# Patient Record
Sex: FEMALE | Race: ASIAN | NOT HISPANIC OR LATINO | Employment: UNEMPLOYED | URBAN - METROPOLITAN AREA
[De-identification: names, ages, dates, MRNs, and addresses within clinical notes are randomized per-mention and may not be internally consistent; named-entity substitution may affect disease eponyms.]

---

## 2023-06-14 ENCOUNTER — ANCILLARY PROCEDURE (OUTPATIENT)
Dept: MAMMOGRAPHY | Facility: CLINIC | Age: 43
End: 2023-06-14
Payer: COMMERCIAL

## 2023-06-14 ENCOUNTER — OFFICE VISIT (OUTPATIENT)
Dept: PEDIATRICS | Facility: CLINIC | Age: 43
End: 2023-06-14
Payer: COMMERCIAL

## 2023-06-14 VITALS
RESPIRATION RATE: 16 BRPM | HEART RATE: 73 BPM | BODY MASS INDEX: 25.14 KG/M2 | OXYGEN SATURATION: 98 % | HEIGHT: 65 IN | WEIGHT: 150.9 LBS | DIASTOLIC BLOOD PRESSURE: 85 MMHG | TEMPERATURE: 97.3 F | SYSTOLIC BLOOD PRESSURE: 120 MMHG

## 2023-06-14 DIAGNOSIS — Z11.59 NEED FOR HEPATITIS C SCREENING TEST: ICD-10-CM

## 2023-06-14 DIAGNOSIS — Z11.3 ROUTINE SCREENING FOR STI (SEXUALLY TRANSMITTED INFECTION): ICD-10-CM

## 2023-06-14 DIAGNOSIS — Z30.431 SURVEILLANCE OF INTRAUTERINE CONTRACEPTIVE DEVICE: ICD-10-CM

## 2023-06-14 DIAGNOSIS — Z11.4 SCREENING FOR HIV (HUMAN IMMUNODEFICIENCY VIRUS): ICD-10-CM

## 2023-06-14 DIAGNOSIS — Z13.1 SCREENING FOR DIABETES MELLITUS: ICD-10-CM

## 2023-06-14 DIAGNOSIS — Z13.220 SCREENING CHOLESTEROL LEVEL: ICD-10-CM

## 2023-06-14 DIAGNOSIS — Z12.31 ENCOUNTER FOR SCREENING MAMMOGRAM FOR BREAST CANCER: ICD-10-CM

## 2023-06-14 DIAGNOSIS — Z12.4 CERVICAL CANCER SCREENING: ICD-10-CM

## 2023-06-14 DIAGNOSIS — Z00.00 ROUTINE GENERAL MEDICAL EXAMINATION AT A HEALTH CARE FACILITY: Primary | ICD-10-CM

## 2023-06-14 DIAGNOSIS — E55.9 VITAMIN D DEFICIENCY: ICD-10-CM

## 2023-06-14 PROBLEM — Z97.5 IUD (INTRAUTERINE DEVICE) IN PLACE: Status: ACTIVE | Noted: 2018-05-04

## 2023-06-14 LAB
ANION GAP SERPL CALCULATED.3IONS-SCNC: 11 MMOL/L (ref 7–15)
BUN SERPL-MCNC: 13.9 MG/DL (ref 6–20)
C TRACH DNA SPEC QL NAA+PROBE: NEGATIVE
CALCIUM SERPL-MCNC: 8.8 MG/DL (ref 8.6–10)
CHLORIDE SERPL-SCNC: 108 MMOL/L (ref 98–107)
CHOLEST SERPL-MCNC: 254 MG/DL
CREAT SERPL-MCNC: 0.72 MG/DL (ref 0.51–0.95)
DEPRECATED CALCIDIOL+CALCIFEROL SERPL-MC: 18 UG/L (ref 20–75)
DEPRECATED HCO3 PLAS-SCNC: 23 MMOL/L (ref 22–29)
GFR SERPL CREATININE-BSD FRML MDRD: >90 ML/MIN/1.73M2
GLUCOSE SERPL-MCNC: 95 MG/DL (ref 70–99)
HBA1C MFR BLD: 5.6 % (ref 0–5.6)
HCV AB SERPL QL IA: NONREACTIVE
HDLC SERPL-MCNC: 69 MG/DL
HIV 1+2 AB+HIV1 P24 AG SERPL QL IA: NONREACTIVE
LDLC SERPL CALC-MCNC: 168 MG/DL
N GONORRHOEA DNA SPEC QL NAA+PROBE: NEGATIVE
NONHDLC SERPL-MCNC: 185 MG/DL
POTASSIUM SERPL-SCNC: 4.4 MMOL/L (ref 3.4–5.3)
SODIUM SERPL-SCNC: 142 MMOL/L (ref 136–145)
TRIGL SERPL-MCNC: 86 MG/DL

## 2023-06-14 PROCEDURE — 82306 VITAMIN D 25 HYDROXY: CPT | Performed by: STUDENT IN AN ORGANIZED HEALTH CARE EDUCATION/TRAINING PROGRAM

## 2023-06-14 PROCEDURE — 87591 N.GONORRHOEAE DNA AMP PROB: CPT | Performed by: STUDENT IN AN ORGANIZED HEALTH CARE EDUCATION/TRAINING PROGRAM

## 2023-06-14 PROCEDURE — 77067 SCR MAMMO BI INCL CAD: CPT | Mod: TC | Performed by: RADIOLOGY

## 2023-06-14 PROCEDURE — 87491 CHLMYD TRACH DNA AMP PROBE: CPT | Performed by: STUDENT IN AN ORGANIZED HEALTH CARE EDUCATION/TRAINING PROGRAM

## 2023-06-14 PROCEDURE — 77063 BREAST TOMOSYNTHESIS BI: CPT | Mod: TC | Performed by: RADIOLOGY

## 2023-06-14 PROCEDURE — 86803 HEPATITIS C AB TEST: CPT | Performed by: STUDENT IN AN ORGANIZED HEALTH CARE EDUCATION/TRAINING PROGRAM

## 2023-06-14 PROCEDURE — 87389 HIV-1 AG W/HIV-1&-2 AB AG IA: CPT | Performed by: STUDENT IN AN ORGANIZED HEALTH CARE EDUCATION/TRAINING PROGRAM

## 2023-06-14 PROCEDURE — 36415 COLL VENOUS BLD VENIPUNCTURE: CPT | Performed by: STUDENT IN AN ORGANIZED HEALTH CARE EDUCATION/TRAINING PROGRAM

## 2023-06-14 PROCEDURE — 99386 PREV VISIT NEW AGE 40-64: CPT | Mod: GC | Performed by: STUDENT IN AN ORGANIZED HEALTH CARE EDUCATION/TRAINING PROGRAM

## 2023-06-14 PROCEDURE — 87624 HPV HI-RISK TYP POOLED RSLT: CPT | Performed by: STUDENT IN AN ORGANIZED HEALTH CARE EDUCATION/TRAINING PROGRAM

## 2023-06-14 PROCEDURE — 83036 HEMOGLOBIN GLYCOSYLATED A1C: CPT | Performed by: STUDENT IN AN ORGANIZED HEALTH CARE EDUCATION/TRAINING PROGRAM

## 2023-06-14 PROCEDURE — G0145 SCR C/V CYTO,THINLAYER,RESCR: HCPCS | Performed by: STUDENT IN AN ORGANIZED HEALTH CARE EDUCATION/TRAINING PROGRAM

## 2023-06-14 PROCEDURE — 80061 LIPID PANEL: CPT | Performed by: STUDENT IN AN ORGANIZED HEALTH CARE EDUCATION/TRAINING PROGRAM

## 2023-06-14 PROCEDURE — 80048 BASIC METABOLIC PNL TOTAL CA: CPT | Performed by: STUDENT IN AN ORGANIZED HEALTH CARE EDUCATION/TRAINING PROGRAM

## 2023-06-14 SDOH — HEALTH STABILITY: PHYSICAL HEALTH: ON AVERAGE, HOW MANY MINUTES DO YOU ENGAGE IN EXERCISE AT THIS LEVEL?: 60 MIN

## 2023-06-14 SDOH — ECONOMIC STABILITY: FOOD INSECURITY: WITHIN THE PAST 12 MONTHS, THE FOOD YOU BOUGHT JUST DIDN'T LAST AND YOU DIDN'T HAVE MONEY TO GET MORE.: SOMETIMES TRUE

## 2023-06-14 SDOH — ECONOMIC STABILITY: INCOME INSECURITY: HOW HARD IS IT FOR YOU TO PAY FOR THE VERY BASICS LIKE FOOD, HOUSING, MEDICAL CARE, AND HEATING?: NOT VERY HARD

## 2023-06-14 SDOH — ECONOMIC STABILITY: TRANSPORTATION INSECURITY
IN THE PAST 12 MONTHS, HAS LACK OF TRANSPORTATION KEPT YOU FROM MEETINGS, WORK, OR FROM GETTING THINGS NEEDED FOR DAILY LIVING?: NO

## 2023-06-14 SDOH — ECONOMIC STABILITY: TRANSPORTATION INSECURITY
IN THE PAST 12 MONTHS, HAS THE LACK OF TRANSPORTATION KEPT YOU FROM MEDICAL APPOINTMENTS OR FROM GETTING MEDICATIONS?: YES

## 2023-06-14 SDOH — HEALTH STABILITY: PHYSICAL HEALTH: ON AVERAGE, HOW MANY DAYS PER WEEK DO YOU ENGAGE IN MODERATE TO STRENUOUS EXERCISE (LIKE A BRISK WALK)?: 4 DAYS

## 2023-06-14 SDOH — ECONOMIC STABILITY: FOOD INSECURITY: WITHIN THE PAST 12 MONTHS, YOU WORRIED THAT YOUR FOOD WOULD RUN OUT BEFORE YOU GOT MONEY TO BUY MORE.: NEVER TRUE

## 2023-06-14 SDOH — ECONOMIC STABILITY: INCOME INSECURITY: IN THE LAST 12 MONTHS, WAS THERE A TIME WHEN YOU WERE NOT ABLE TO PAY THE MORTGAGE OR RENT ON TIME?: NO

## 2023-06-14 ASSESSMENT — ENCOUNTER SYMPTOMS
HEMATURIA: 0
JOINT SWELLING: 0
PARESTHESIAS: 0
NAUSEA: 0
SORE THROAT: 0
FEVER: 0
HEMATOCHEZIA: 0
HEADACHES: 0
FREQUENCY: 0
MYALGIAS: 0
SHORTNESS OF BREATH: 0
ABDOMINAL PAIN: 0
DIZZINESS: 0
WEAKNESS: 0
HEARTBURN: 0
EYE PAIN: 0
COUGH: 0
DYSURIA: 0
CONSTIPATION: 0
NERVOUS/ANXIOUS: 0
ARTHRALGIAS: 0
PALPITATIONS: 0
CHILLS: 0
DIARRHEA: 0

## 2023-06-14 ASSESSMENT — SOCIAL DETERMINANTS OF HEALTH (SDOH)
DO YOU BELONG TO ANY CLUBS OR ORGANIZATIONS SUCH AS CHURCH GROUPS UNIONS, FRATERNAL OR ATHLETIC GROUPS, OR SCHOOL GROUPS?: NO
HOW OFTEN DO YOU ATTEND CHURCH OR RELIGIOUS SERVICES?: MORE THAN 4 TIMES PER YEAR
IN A TYPICAL WEEK, HOW MANY TIMES DO YOU TALK ON THE PHONE WITH FAMILY, FRIENDS, OR NEIGHBORS?: THREE TIMES A WEEK
HOW OFTEN DO YOU GET TOGETHER WITH FRIENDS OR RELATIVES?: ONCE A WEEK

## 2023-06-14 ASSESSMENT — LIFESTYLE VARIABLES
HOW OFTEN DO YOU HAVE SIX OR MORE DRINKS ON ONE OCCASION: NEVER
HOW MANY STANDARD DRINKS CONTAINING ALCOHOL DO YOU HAVE ON A TYPICAL DAY: PATIENT DOES NOT DRINK
AUDIT-C TOTAL SCORE: 1
HOW OFTEN DO YOU HAVE A DRINK CONTAINING ALCOHOL: MONTHLY OR LESS
SKIP TO QUESTIONS 9-10: 1

## 2023-06-14 ASSESSMENT — PAIN SCALES - GENERAL: PAINLEVEL: NO PAIN (0)

## 2023-06-14 NOTE — LETTER
"June 20, 2023      Blaine Gonzalez  [address] [place] [address]        Ms. Gonzalez,     It was nice to see you in clinic. Enclosed are your lab results.     Your cholesterol is high- keep working on the body movement and nutrition like you are.     Your hemoglobin a1c (screens for diabetes) was normal.     Your vitamin D level was low. Vitamin D helps absorb calcium and is important for good bone health. I sent a prescription for high-dose replacement therapy to your pharmacy. Take this weekly for 8 weeks, then start vitamin D 2000 IU daily (this is over the counter). After the 8 weeks of high-dose treatment make a \"lab only\" appointment so we can recheck your levels. You can make the \"lab only\" appointment through [facility] or by calling us at [phone].     Your electrolytes and kidney function were normal.     Your standard STI screening was negative.     Resulted Orders   HIV Antigen Antibody Combo   Result Value Ref Range    HIV Antigen Antibody Combo Nonreactive Nonreactive      Comment:      HIV-1 p24 Ag & HIV-1/HIV-2 Ab Not Detected   Hepatitis C Screen Reflex to HCV RNA Quant and Genotype   Result Value Ref Range    Hepatitis C Antibody Nonreactive Nonreactive    Narrative    Assay performance characteristics have not been established for newborns, infants, and children.   NEISSERIA GONORRHOEA PCR   Result Value Ref Range    Neisseria gonorrhoeae Negative Negative      Comment:      Negative for N. gonorrhoeae rRNA by transcription mediated amplification. A negative result by transcription mediated amplification does not preclude the presence of C. trachomatis infection because results are dependent on proper and adequate collection, absence of inhibitors and sufficient rRNA to be detected.   CHLAMYDIA TRACHOMATIS PCR   Result Value Ref Range    Chlamydia trachomatis Negative Negative      Comment:      A negative result by transcription mediated amplification does not preclude the presence of C. trachomatis " infection because results are dependent on proper and adequate collection, absence of inhibitors and sufficient rRNA to be detected.   Lipid panel reflex to direct LDL Fasting   Result Value Ref Range    Cholesterol 254 (H) <200 mg/dL    Triglycerides 86 <150 mg/dL    Direct Measure HDL 69 >=50 mg/dL    LDL Cholesterol Calculated 168 (H) <=100 mg/dL    Non HDL Cholesterol 185 (H) <130 mg/dL    Narrative    Cholesterol  Desirable:  <200 mg/dL    Triglycerides  Normal:  Less than 150 mg/dL  Borderline High:  150-199 mg/dL  High:  200-499 mg/dL  Very High:  Greater than or equal to 500 mg/dL    Direct Measure HDL  Female:  Greater than or equal to 50 mg/dL   Male:  Greater than or equal to 40 mg/dL    LDL Cholesterol  Desirable:  <100mg/dL  Above Desirable:  100-129 mg/dL   Borderline High:  130-159 mg/dL   High:  160-189 mg/dL   Very High:  >= 190 mg/dL    Non HDL Cholesterol  Desirable:  130 mg/dL  Above Desirable:  130-159 mg/dL  Borderline High:  160-189 mg/dL  High:  190-219 mg/dL  Very High:  Greater than or equal to 220 mg/dL   Basic metabolic panel  (Ca, Cl, CO2, Creat, Gluc, K, Na, BUN)   Result Value Ref Range    Sodium 142 136 - 145 mmol/L    Potassium 4.4 3.4 - 5.3 mmol/L    Chloride 108 (H) 98 - 107 mmol/L    Carbon Dioxide (CO2) 23 22 - 29 mmol/L    Anion Gap 11 7 - 15 mmol/L    Urea Nitrogen 13.9 6.0 - 20.0 mg/dL    Creatinine 0.72 0.51 - 0.95 mg/dL    Calcium 8.8 8.6 - 10.0 mg/dL    Glucose 95 70 - 99 mg/dL    GFR Estimate >90 >60 mL/min/1.73m2      Comment:      eGFR calculated using 2021 CKD-EPI equation.   Vitamin D Deficiency   Result Value Ref Range    Vitamin D, Total (25-Hydroxy) 18 (L) 20 - 75 ug/L    Narrative    Season, race, dietary intake, and treatment affect the concentration of 25-hydroxy-Vitamin D. Values may decrease during winter months and increase during summer months. Values 20-29 ug/L may indicate Vitamin D insufficiency and values <20 ug/L may indicate Vitamin D  deficiency.    Vitamin D determination is routinely performed by an immunoassay specific for 25 hydroxyvitamin D3.  If an individual is on vitamin D2(ergocalciferol) supplementation, please specify 25 OH vitamin D2 and D3 level determination by LCMSMS test VITD23.     Hemoglobin A1c   Result Value Ref Range    Hemoglobin A1C 5.6 0.0 - 5.6 %      Comment:      Normal <5.7%   Prediabetes 5.7-6.4%    Diabetes 6.5% or higher     Note: Adopted from ADA consensus guidelines.       If you have any questions or concerns, please call the clinic at the number listed above.       Sincerely,      Uriah Drake MD

## 2023-06-14 NOTE — PATIENT INSTRUCTIONS
Nice to meet you.     We will check some basic labs today. We will send you a letter with all of the lab results, including your pap smear.     When you turn 45, we will start to discuss colon cancer screening.       Preventive Health Recommendations  Female Ages 40 to 49    Yearly exam:   See your health care provider every year in order to  Review health changes.   Discuss preventive care.    Review your medicines if your doctor prescribed any.    Get a Pap test every three years (unless you have an abnormal result and your provider advises testing more often).    If you get Pap tests with HPV test, you only need to test every 5 years, unless you have an abnormal result. You do not need a Pap test if your uterus was removed (hysterectomy) and you have not had cancer.    You should be tested each year for STDs (sexually transmitted diseases), if you're at risk.   Ask your doctor if you should have a mammogram.    Have a colonoscopy (test for colon cancer) if someone in your family has had colon cancer or polyps before age 50.     Have a cholesterol test every 5 years.     Have a diabetes test (fasting glucose) after age 45. If you are at risk for diabetes, you should have this test every 3 years.    Shots: Get a flu shot each year. Get a tetanus shot every 10 years.     Nutrition:   Eat at least 5 servings of fruits and vegetables each day.  Eat whole-grain bread, whole-wheat pasta and brown rice instead of white grains and rice.  Get adequate Calcium and Vitamin D.      Lifestyle  Exercise at least 150 minutes a week (an average of 30 minutes a day, 5 days a week). This will help you control your weight and prevent disease.  Limit alcohol to one drink per day.  No smoking.   Wear sunscreen to prevent skin cancer.  See your dentist every six months for an exam and cleaning.

## 2023-06-14 NOTE — PROGRESS NOTES
"a1c     SUBJECTIVE:   CC: Blaine is an 43 year old who presents for preventive health visit.        View : No data to display.              Healthy Habits:     Getting at least 3 servings of Calcium per day:  NO    Bi-annual eye exam:  NO    Dental care twice a year:  NO    Sleep apnea or symptoms of sleep apnea:  None    Diet:  Regular (no restrictions)    Frequency of exercise:  4-5 days/week    Duration of exercise:  45-60 minutes    Taking medications regularly:  Yes    Medication side effects:  Not applicable    PHQ-2 Total Score: 0    Additional concerns today:  No    A professional mandarin  was used via telephone for entire visit.     Per [facility] note,  on discussion has \"permanent\" IUD placed in [place] for contraception - on review of literature, they do use IUDs that are deemed to impart \"permanent\" birth control. Previously had mirena but had newest device placed in [place] about 5 years ago.     Patient reports breast firmness or tenderness preceding menses by one week. Menses are very regular, even with \"permanent\" IUD in place. No skin changes or nipple discharge. No family history of breast cancer.     Had normal pap smear with negative HPV 3/2017    After covid vaccine throat feels a little stuffy. Feels like voice is different. Feels like something is \"stuck there\" but no choking or difficulty swallowing.     Today's PHQ-2 Score:       6/14/2023     8:54 AM   PHQ-2 ( 1999 [employer])   Q1: Little interest or pleasure in doing things 0   Q2: Feeling down, depressed or hopeless 0   PHQ-2 Score 0   Q1: Little interest or pleasure in doing things Not at all   Q2: Feeling down, depressed or hopeless Not at all   PHQ-2 Score 0       Have you ever done Advance Care Planning? (For example, a Health Directive, POLST, or a discussion with a medical provider or your loved ones about your wishes): No, advance care planning information given to patient to review.  Patient plans to discuss their wishes with " "loved ones or provider.      Social History     Tobacco Use     Smoking status: Never     Smokeless tobacco: Never   Vaping Use     Vaping status: Never Used   Substance Use Topics     Alcohol use: Not on file             6/14/2023     8:54 AM   Alcohol Use   Prescreen: >3 drinks/day or >7 drinks/week? No     Breast Cancer Screening:  Any new diagnosis of family breast, ovarian, or bowel cancer? No    FHS-7:        View : No data to display.                Mammogram Screening - Offered annual screening and updated Health Maintenance for [place] plan based on risk factor consideration    Pertinent mammograms are reviewed under the imaging tab.    History of abnormal Pap smear: NO - age 30-65 PAP every 5 years with negative HPV co-testing recommended     Reviewed and updated as needed this visit by clinical staff   Tobacco  Allergies  Meds              Reviewed and updated as needed this visit by Provider                     Review of Systems   Constitutional: Negative for chills and fever.   HENT: Negative for congestion, ear pain, hearing loss and sore throat.    Eyes: Negative for pain and visual disturbance.   Respiratory: Negative for cough and shortness of breath.    Cardiovascular: Negative for chest pain, palpitations and peripheral edema.   Gastrointestinal: Negative for abdominal pain, constipation, diarrhea, heartburn, hematochezia and nausea.   Genitourinary: Negative for dysuria, frequency, genital sores, hematuria and urgency.   Musculoskeletal: Negative for arthralgias, joint swelling and myalgias.   Skin: Negative for rash.   Neurological: Negative for dizziness, weakness, headaches and paresthesias.   Psychiatric/Behavioral: Negative for mood changes. The patient is not nervous/anxious.         OBJECTIVE:   /85   Pulse 73   Temp 97.3  F (36.3  C)   Resp 16   Ht 1.653 m (5' 5.08\")   Wt 68.4 kg (150 lb 14.4 oz)   LMP 06/08/2023   SpO2 98%   BMI 25.05 kg/m    Physical Exam  GENERAL: " healthy, alert and no distress  NECK: no adenopathy, no asymmetry, masses, or scars and thyroid normal to palpation  RESP: lungs clear to auscultation - no rales, rhonchi or wheezes  CV: regular rate and rhythm, normal S1 S2, no S3 or S4, no murmur, click or rub, no peripheral edema and peripheral pulses strong  ABDOMEN: soft, nontender, no hepatosplenomegaly, no masses and bowel sounds normal   (female): normal female external genitalia, normal urethral meatus, vaginal mucosa, normal cervix/adnexa/uterus without masses or discharge  MS: no gross musculoskeletal defects noted, no edema    Diagnostic Test Results:  Labs reviewed in Epic    ASSESSMENT/PLAN:   Blaine was seen today for physical.    Diagnoses and all orders for this visit:    Routine general medical examination at a health care facility    Screening for HIV (human immunodeficiency virus)  Screening per protocol, no known exposure.  -     HIV Antigen Antibody Combo    Need for hepatitis C screening test  Screening per protocol, no known exposure.  -     Hepatitis C Screen Reflex to HCV RNA Quant and Genotype    Cervical cancer screening  Normal exam. No previous abnormal results.   -     Pap Screen with HPV - recommended age 30 - 65 years    Encounter for screening mammogram for breast cancer  History is not concerning given hormone fluctuations during normal cycle. Discussed early screening mammo, patient agreeable.   -     Cancel: *MA Screening Digital Bilateral; Future    Screening cholesterol level  Baseline labs.   -     Lipid panel reflex to direct LDL Fasting    Vitamin D deficiency  Previously low, not currently on supplement.   -     Basic metabolic panel  (Ca, Cl, CO2, Creat, Gluc, K, Na, BUN); Future  -     Vitamin D Deficiency; Future  -     Basic metabolic panel  (Ca, Cl, CO2, Creat, Gluc, K, Na, BUN)  -     Vitamin D Deficiency    Screening for diabetes mellitus  No known family historry.   -     Basic metabolic panel  (Ca, Cl, CO2,  Creat, Gluc, K, Na, BUN); Future  -     Hemoglobin A1c; Future  -     Basic metabolic panel  (Ca, Cl, CO2, Creat, Gluc, K, Na, BUN)  -     Hemoglobin A1c    Routine screening for STI (sexually transmitted infection)  Patient requested screening, no known exposures.   -     NEISSERIA GONORRHOEA PCR; Future  -     CHLAMYDIA TRACHOMATIS PCR; Future  -     NEISSERIA GONORRHOEA PCR  -     CHLAMYDIA TRACHOMATIS PCR    Surveillance of intrauterine contraceptive device  Given uncertainty of intrauterine device, no strings visualized on exam, will obtain ultrasound to ensure device has not migrated extrauterine  -     US Pelvic Complete with Transvaginal; Future      COUNSELING:  Reviewed preventive health counseling, as reflected in patient instructions        She reports that she has never smoked. She has never used smokeless tobacco.      Aishwarya Cabello MD  [facility]    Physician Attestation   I, Uriah Drake MD, saw this patient and agree with the findings and plan of care as documented in the note.      Items personally reviewed/procedural attestation: vitals and labs.    Uriah Drake MD

## 2023-06-16 DIAGNOSIS — E55.9 VITAMIN D DEFICIENCY: Primary | ICD-10-CM

## 2023-06-16 LAB
BKR LAB AP GYN ADEQUACY: NORMAL
BKR LAB AP GYN INTERPRETATION: NORMAL
BKR LAB AP HPV REFLEX: NORMAL
BKR LAB AP PREVIOUS ABNORMAL: NORMAL
PATH REPORT.COMMENTS IMP SPEC: NORMAL
PATH REPORT.COMMENTS IMP SPEC: NORMAL
PATH REPORT.RELEVANT HX SPEC: NORMAL

## 2023-06-16 RX ORDER — ERGOCALCIFEROL 1.25 MG/1
50000 CAPSULE, LIQUID FILLED ORAL WEEKLY
Qty: 8 CAPSULE | Refills: 0 | Status: SHIPPED | OUTPATIENT
Start: 2023-06-16 | End: 2024-07-02

## 2023-06-19 ENCOUNTER — HOSPITAL ENCOUNTER (OUTPATIENT)
Dept: MAMMOGRAPHY | Facility: CLINIC | Age: 43
Discharge: HOME | End: 2023-06-19
Attending: INTERNAL MEDICINE
Payer: COMMERCIAL

## 2023-06-19 ENCOUNTER — HOSPITAL ENCOUNTER (OUTPATIENT)
Dept: ULTRASOUND IMAGING | Facility: CLINIC | Age: 43
Discharge: HOME | End: 2023-06-19
Attending: INTERNAL MEDICINE
Payer: COMMERCIAL

## 2023-06-19 ENCOUNTER — ANCILLARY PROCEDURE (OUTPATIENT)
Dept: ULTRASOUND IMAGING | Facility: CLINIC | Age: 43
End: 2023-06-19
Attending: INTERNAL MEDICINE
Payer: COMMERCIAL

## 2023-06-19 ENCOUNTER — TELEPHONE (OUTPATIENT)
Dept: PEDIATRICS | Facility: CLINIC | Age: 43
End: 2023-06-19

## 2023-06-19 DIAGNOSIS — Z30.431 SURVEILLANCE OF INTRAUTERINE CONTRACEPTIVE DEVICE: ICD-10-CM

## 2023-06-19 DIAGNOSIS — R92.8 ABNORMAL MAMMOGRAM: ICD-10-CM

## 2023-06-19 DIAGNOSIS — R92.8 ABNORMAL MAMMOGRAM: Primary | ICD-10-CM

## 2023-06-19 PROCEDURE — 76830 TRANSVAGINAL US NON-OB: CPT | Performed by: OBSTETRICS & GYNECOLOGY

## 2023-06-19 PROCEDURE — 76856 US EXAM PELVIC COMPLETE: CPT | Performed by: OBSTETRICS & GYNECOLOGY

## 2023-06-19 PROCEDURE — 76642 ULTRASOUND BREAST LIMITED: CPT | Mod: LT

## 2023-06-19 PROCEDURE — 77061 BREAST TOMOSYNTHESIS UNI: CPT | Mod: LT

## 2023-06-19 NOTE — TELEPHONE ENCOUNTER
----- Message from Uriah Drake MD sent at 6/19/2023  4:58 PM CDT -----  Review results note w/ .  -------  Hi Ms. Gonzalez,     Thank you for going in for extra pictures. It looks like the mass they saw is something benign called a fibroadenoma (not cancer). But they do recommend looking at this again in 6 months. I put in the order for this and you should hear from Radiology closer to December.     Please let me know if you have any questions,   E. Tita Drake MD  Internal Medicine-Pediatrics

## 2023-06-20 LAB
HUMAN PAPILLOMA VIRUS 16 DNA: NEGATIVE
HUMAN PAPILLOMA VIRUS 18 DNA: NEGATIVE
HUMAN PAPILLOMA VIRUS FINAL DIAGNOSIS: NORMAL
HUMAN PAPILLOMA VIRUS OTHER HR: NEGATIVE

## 2023-06-21 NOTE — TELEPHONE ENCOUNTER
Called and spoke with patient via . Relayed result note below. Patient verbalized understanding and agreed with plan. No further questions at this time.     Mani HAMMOND RN 6/21/2023 at 3:52 PM

## 2024-04-15 ENCOUNTER — TRANSFERRED RECORDS (OUTPATIENT)
Dept: MULTI SPECIALTY CLINIC | Facility: CLINIC | Age: 44
End: 2024-04-15

## 2024-05-22 ENCOUNTER — PRE VISIT (OUTPATIENT)
Dept: ONCOLOGY | Facility: CLINIC | Age: 44
End: 2024-05-22
Payer: COMMERCIAL

## 2024-05-22 ENCOUNTER — PATIENT OUTREACH (OUTPATIENT)
Dept: ONCOLOGY | Facility: CLINIC | Age: 44
End: 2024-05-22
Payer: COMMERCIAL

## 2024-05-22 ENCOUNTER — TRANSCRIBE ORDERS (OUTPATIENT)
Dept: OTHER | Age: 44
End: 2024-05-22

## 2024-05-22 DIAGNOSIS — R93.89 ABNORMAL FINDING ON CT SCAN: Primary | ICD-10-CM

## 2024-05-22 NOTE — TELEPHONE ENCOUNTER
Action    Action Taken 5/22/24  Received call from Ernesto, friend of pt, advising no emails received to send records in reply. Emailed [email or web address] per Ernesto's request.  12:36 PM

## 2024-05-22 NOTE — TELEPHONE ENCOUNTER
Action Taken  Date/Description  TJ     WT from [facility] May 22, 2024 11:56 AM    Call from: Pt's friend Ernesto (Language barrier)  PH: [phone]  CT SCANS Done in [place], also records are in [place] Mandarin language.  They have a copy of the report that they are going to email to Endra.  Diagnosis:  Abnormal finding on CT scan [R93.89]  Date of Dx and Referred By/Location:    Records updated in Care Everywhere:  NA  Has Pt been anywhere else for this condition/concern?  records [facility] at [facility]  Prior history of Hematologist, Oncologist: No  Previous Radiation:  No  Genetic Testing conducted:  No  Previous Surgical procedures or biopsies: No  Imagin imaging [place] PACS - [place]  Any scheduled follow up's/imaging/surgical procedures/biopsies?  No  Any outstanding orders/planning to schedule:  No

## 2024-05-22 NOTE — PROGRESS NOTES
Ernesto called me back while I was on the phone with another pt.  He said he called her PC{ clinic yesterday and they told him they do not have any availability until July.  He wants me to call and tell the clinic the pt needs to be seen.  I will call Ernesto back as that is something I cannot do.

## 2024-05-22 NOTE — PROGRESS NOTES
I called and was able to speak with Ernesto this time.  I explained I cannot call the [place] clinic.  If the patient is having symptoms they can triage and determine how soon she should be seen.  He did tell me they found an appt in [place] for the pt and she is agreeable to going there.  I confirmed with Ernesto that this doctor can then refer her to the correct specialist if that is what is needed.  He appreciated the call back.

## 2024-05-22 NOTE — PROGRESS NOTES
I rec'd a new referral for this patient with a note on the referral:      Ernesto Friend called due to lang barrier, abnormal finding on CT, CT SCANS Done in [place], also records are in Chinese [place]     I called Ernesto and left a detailed vm.  I explained the patient needs to see her PCP to determine if she needs further work-up of her symptoms.  We do not have enough/any information where our doctors could see her and make any recriminations.  I left him the number for her PCP office.

## 2024-05-23 ENCOUNTER — TELEPHONE (OUTPATIENT)
Dept: PULMONOLOGY | Facility: CLINIC | Age: 44
End: 2024-05-23

## 2024-05-23 NOTE — TELEPHONE ENCOUNTER
"Left Voicemail (1st Attempt) for the patient to call back and schedule the following:    Appointment type: New Gen Pulm   Provider: None  Return date: Next Avail  Specialty phone number: [phone]  Additional appointment(s) needed: full pft, and cxr  Additonal Notes: Per Imelda: \"This would just be considered a general pulmonary consult as they are seeking a 2nd opinion. We will not be able to retrieve those records for them. We can offer next available.\"  "

## 2024-05-28 NOTE — TELEPHONE ENCOUNTER
"Left Voicemail (2nd Attempt) for the patient to call back and schedule the following:    Appointment type: New Gen Pulm   Provider: None  Return date: Next Avail  Specialty phone number: [phone]  Additional appointment(s) needed: full pft, and cxr  Additonal Notes: Per Mielda: \"This would just be considered a general pulmonary consult as they are seeking a 2nd opinion. We will not be able to retrieve those records for them. We can offer next available.\"  "

## 2024-06-03 ENCOUNTER — OFFICE VISIT (OUTPATIENT)
Dept: FAMILY MEDICINE | Facility: CLINIC | Age: 44
End: 2024-06-03
Payer: COMMERCIAL

## 2024-06-03 VITALS
BODY MASS INDEX: 24.39 KG/M2 | HEART RATE: 75 BPM | TEMPERATURE: 98.6 F | SYSTOLIC BLOOD PRESSURE: 116 MMHG | WEIGHT: 146.4 LBS | OXYGEN SATURATION: 98 % | DIASTOLIC BLOOD PRESSURE: 80 MMHG | HEIGHT: 65 IN | RESPIRATION RATE: 16 BRPM

## 2024-06-03 DIAGNOSIS — R19.7 DIARRHEA, UNSPECIFIED TYPE: ICD-10-CM

## 2024-06-03 DIAGNOSIS — R93.89 ABNORMAL CT OF THE CHEST: Primary | ICD-10-CM

## 2024-06-03 DIAGNOSIS — N63.21 MASS OF UPPER OUTER QUADRANT OF LEFT BREAST: ICD-10-CM

## 2024-06-03 LAB
ERYTHROCYTE [DISTWIDTH] IN BLOOD BY AUTOMATED COUNT: 15.6 % (ref 10–15)
HCT VFR BLD AUTO: 35 % (ref 35–47)
HGB BLD-MCNC: 10.9 G/DL (ref 11.7–15.7)
MCH RBC QN AUTO: 24.9 PG (ref 26.5–33)
MCHC RBC AUTO-ENTMCNC: 31.1 G/DL (ref 31.5–36.5)
MCV RBC AUTO: 80 FL (ref 78–100)
PLATELET # BLD AUTO: 265 10E3/UL (ref 150–450)
RBC # BLD AUTO: 4.38 10E6/UL (ref 3.8–5.2)
WBC # BLD AUTO: 4.7 10E3/UL (ref 4–11)

## 2024-06-03 PROCEDURE — 36415 COLL VENOUS BLD VENIPUNCTURE: CPT

## 2024-06-03 PROCEDURE — 87338 HPYLORI STOOL AG IA: CPT

## 2024-06-03 PROCEDURE — 99204 OFFICE O/P NEW MOD 45 MIN: CPT

## 2024-06-03 PROCEDURE — 80053 COMPREHEN METABOLIC PANEL: CPT

## 2024-06-03 PROCEDURE — 85027 COMPLETE CBC AUTOMATED: CPT

## 2024-06-03 ASSESSMENT — PAIN SCALES - GENERAL: PAINLEVEL: NO PAIN (0)

## 2024-06-03 NOTE — PROGRESS NOTES
"  Assessment & Plan     Abnormal CT of the chest  CT chest completed 4/2023 in [place] which showed nodule in left lung- \"something dark, approximately 1 cm.\"  Patient was told to follow-up in the [place] for diagnosis and treatment.  Asymptomatic.  Will repeat chest CT for evaluation.  Will look for any abnormalities in WBC or CMP.  Patient is trying to schedule pulmonology and oncology visit for second opinion.  - CT Chest w/o Contrast; Future  - Comprehensive metabolic panel  - CBC with platelets    Mass of upper outer quadrant of left breast  Imaging from 6/2023 found benign left breast mass at 10:30 o'clock 7 cm from the nipple, favoring fibroadenoma.  Palpated left breast  mass at 10:00 position.  Denies change in breast mass.  Nonpainful.  - MA Diagnostic Digital Bilateral; Future  - US Breast Left Limited 1-3 Quadrants; Future    Diarrhea, unspecified type  [place] medical provider told patient she had bacterial infection of the stomach but was told to come back to [place] for medications.  She was told \"it is a pretty common bacteria of [place] people.\"  Patient does not know the name of the bacteria.  Asymptomatic.  Will rule out H. pylori although low suspicion.  - Helicobacter pylori Antigen Stool        Subjective   Blaine is a 44 year old, presenting for the following health issues:  Follow Up (Patient states that she has a \"dark shadow in her stomach.\" Patient had physical exam while in [place] and they advised her to follow up with a provider in [facility]. )      6/3/2024    12:51 PM   Additional Questions   Roomed by Lili LUCAS CMA     History of Present Illness       Hypothyroidism:     Since last visit, patient describes the following symptoms::  Constipation and Weight gain    Weight gain::  No weight gain    She eats 2-3 servings of fruits and vegetables daily.She consumes 4 sweetened beverage(s) daily.She exercises with enough effort to increase her heart rate 30 to 60 minutes per day.  She exercises with " "enough effort to increase her heart rate 5 days per week.   She is taking medications regularly.    Patient is a 44 year old female presenting for several concerns.  Medical history includes vitamin D deficiency, anemia, hepatitis B carrier.  Phone  used during visit.      Benign left breast mass found in 2023.  Has not noted any changes in size of breast mass.  Couple days before menstrual cycle will experience abdominal bloating and left breast pain.   Left breast pain will resolve after menstrual cycle starts.  Cassius IUD placed 2016.  6/2023 US left breast & mammogram IMPRESSION:    Probably benign mass at 10:30 o'clock 7 cm from the  nipple, favoring fibroadenoma. Additional mammographic asymmetries  partly effaced. Recommend short-term follow-up with a left breast  mammogram and ultrasound in 6 months.    Patient had imaging of chest and abdomen completed in 4/2024 when she returned back to [place] for physical exam.  Patient reports CT chest showed nodule in left breast/left lung area- \"something dark, approximately 1 cm.\"  Phone  unable to completely transcribe CT chest results oh patient phone.  Patient is worried about cancer.  Denies any respiratory symptoms of cough, congestion, dyspnea, weight loss, hemoptysis.  Patient is trying to schedule pulmonology and oncology visit for second opinion.  CT abdomen did not show any abnormalities.      Patient also had endoscopy and colonoscopy completed in [place].  During colonoscopy had two abnormal growths removed, non-cancerous.  Since receiving covid vaccines in 2021, patient reports nodule in her throat.  Endoscopy did not find any abnormalities.  [place] medical provider told patient she had bacterial infection of the stomach but was told to come back to [place] for medications.  She was told \"it is a pretty common bacteria of [place] people.\"  Previously had diarrhea which has resolved after colonoscopy.  Denies any abdominal pain, nausea, " "vomiting, diarrhea, constipation, rectal bleeding, melena, fever, weight loss.     Review of Systems  Review of systems negative otherwise known HPI.    No past medical history on file.    No past surgical history on file.    Family History   Problem Relation Age of Onset    Breast Cancer No family hx of      Social History     Tobacco Use    Smoking status: Never    Smokeless tobacco: Never   Substance Use Topics    Alcohol use: Not on file     Current Outpatient Medications   Medication Sig Dispense Refill    vitamin D2 (ERGOCALCIFEROL) 09129 units (1250 mcg) capsule Take 1 capsule (50,000 Units) by mouth once a week (Patient not taking: Reported on 6/3/2024) 8 capsule 0     No current facility-administered medications for this visit.       Objective    /80 (BP Location: Right arm, Patient Position: Sitting, Cuff Size: Adult Regular)   Pulse 75   Temp 98.6  F (37  C) (Temporal)   Resp 16   Ht 1.644 m (5' 4.72\")   Wt 66.4 kg (146 lb 6.4 oz)   LMP 05/31/2024 (Approximate)   SpO2 98%   BMI 24.57 kg/m    Body mass index is 24.57 kg/m .  Physical Exam   General: Alert, oriented, no acute distress.    Head: Normocephalic and atraumatic.   Eyes: Conjunctiva and sclera clear.   Respiratory: Lungs clear, unlabored. No rales, rhonchi, or wheezes.    Cardiovascular: Regular rate and rhythm, S1 and S2. No murmurs. No peripheral edema.     Breasts: No palpable axillary masses or adenopathy. Mass to left breast at 10:00 position.   Gastrointestinal: Normoactive bowel sounds. Soft, nontender, no organomegaly.     Musculoskeletal: No joint tenderness, deformity, or swelling.     Skin: No suspicious lesions, rashes, or abnormalities.    Neurologic: No gross motor or sensory deficit. Mentation intact and speech normal.     Psychiatric: Appropriate affect.     Signed Electronically by: PARVEEN Pacheco CNP    "

## 2024-06-04 DIAGNOSIS — D50.9 NORMOCYTIC HYPOCHROMIC ANEMIA: Primary | ICD-10-CM

## 2024-06-04 LAB
ALBUMIN SERPL BCG-MCNC: 4.3 G/DL (ref 3.5–5.2)
ALP SERPL-CCNC: 67 U/L (ref 40–150)
ALT SERPL W P-5'-P-CCNC: 21 U/L (ref 0–50)
ANION GAP SERPL CALCULATED.3IONS-SCNC: 13 MMOL/L (ref 7–15)
AST SERPL W P-5'-P-CCNC: 26 U/L (ref 0–45)
BILIRUB SERPL-MCNC: 0.3 MG/DL
BUN SERPL-MCNC: 11.8 MG/DL (ref 6–20)
CALCIUM SERPL-MCNC: 9.3 MG/DL (ref 8.6–10)
CHLORIDE SERPL-SCNC: 101 MMOL/L (ref 98–107)
CREAT SERPL-MCNC: 0.61 MG/DL (ref 0.51–0.95)
DEPRECATED HCO3 PLAS-SCNC: 23 MMOL/L (ref 22–29)
EGFRCR SERPLBLD CKD-EPI 2021: >90 ML/MIN/1.73M2
GLUCOSE SERPL-MCNC: 86 MG/DL (ref 70–99)
POTASSIUM SERPL-SCNC: 4.2 MMOL/L (ref 3.4–5.3)
PROT SERPL-MCNC: 7.4 G/DL (ref 6.4–8.3)
SODIUM SERPL-SCNC: 137 MMOL/L (ref 135–145)

## 2024-06-05 ENCOUNTER — TELEPHONE (OUTPATIENT)
Dept: FAMILY MEDICINE | Facility: CLINIC | Age: 44
End: 2024-06-05
Payer: COMMERCIAL

## 2024-06-05 LAB — H PYLORI AG STL QL IA: POSITIVE

## 2024-06-05 NOTE — TELEPHONE ENCOUNTER
----- Message from PARVEEN Pacheco CNP sent at 6/4/2024  6:33 PM CDT -----  Please share results with patient as she does not have MyChart, does need [facility] .   Comprehensive metabolic panel (electrolytes, kidney function, liver function) within normal range.  CBC: no elevation in white blood count which suggest absence of bacterial infection.  Slight decrease in hemoglobin-normocytic hypochromic anemia.  Incorporate iron rich diet.  Recommended rechecking CBC in 2 weeks, please make lab only visit.

## 2024-06-05 NOTE — TELEPHONE ENCOUNTER
Left message with mandarin  for patient to return call.     Lili LUCAS CMA on June 5, 2024 at 12:28 PM

## 2024-06-05 NOTE — LETTER
Eugenia 10, 2024      Blaine Gonzalez  [address] [place] [address]        Dear ,    Comprehensive metabolic panel (electrolytes, kidney function, liver function) within normal range.  CBC: no elevation in white blood count which suggest absence of bacterial infection. Slight decrease in hemoglobin-normocytic hypochromic anemia.  Incorporate iron rich diet.  Recommended rechecking CBC in 2 weeks.    Positive H-pylori test. Please schedule a follow up appointment as soon as possible to discuss treatment options.     Resulted Orders   Comprehensive metabolic panel   Result Value Ref Range    Sodium 137 135 - 145 mmol/L      Comment:      Reference intervals for this test were updated on 09/26/2023 to more accurately reflect our healthy population. There may be differences in the flagging of prior results with similar values performed with this method. Interpretation of those prior results can be made in the context of the updated reference intervals.     Potassium 4.2 3.4 - 5.3 mmol/L    Carbon Dioxide (CO2) 23 22 - 29 mmol/L    Anion Gap 13 7 - 15 mmol/L    Urea Nitrogen 11.8 6.0 - 20.0 mg/dL    Creatinine 0.61 0.51 - 0.95 mg/dL    GFR Estimate >90 >60 mL/min/1.73m2    Calcium 9.3 8.6 - 10.0 mg/dL    Chloride 101 98 - 107 mmol/L    Glucose 86 70 - 99 mg/dL    Alkaline Phosphatase 67 40 - 150 U/L    AST 26 0 - 45 U/L      Comment:      Reference intervals for this test were updated on 6/12/2023 to more accurately reflect our healthy population. There may be differences in the flagging of prior results with similar values performed with this method. Interpretation of those prior results can be made in the context of the updated reference intervals.    ALT 21 0 - 50 U/L      Comment:      Reference intervals for this test were updated on 6/12/2023 to more accurately reflect our healthy population. There may be differences in the flagging of prior results with similar values performed with this method. Interpretation  of those prior results can be made in the context of the updated reference intervals.      Protein Total 7.4 6.4 - 8.3 g/dL    Albumin 4.3 3.5 - 5.2 g/dL    Bilirubin Total 0.3 <=1.2 mg/dL   CBC with platelets   Result Value Ref Range    WBC Count 4.7 4.0 - 11.0 10e3/uL    RBC Count 4.38 3.80 - 5.20 10e6/uL    Hemoglobin 10.9 (L) 11.7 - 15.7 g/dL    Hematocrit 35.0 35.0 - 47.0 %    MCV 80 78 - 100 fL    MCH 24.9 (L) 26.5 - 33.0 pg    MCHC 31.1 (L) 31.5 - 36.5 g/dL    RDW 15.6 (H) 10.0 - 15.0 %    Platelet Count 265 150 - 450 10e3/uL   Helicobacter pylori Antigen Stool   Result Value Ref Range    Helicobacter pylori Antigen Stool Positive (A) Negative      Comment:      Positive for Helicobacter pylori antigen by enzyme immunoassay. A positive result indicates the presence of H. pylori antigen.       If you have any questions or concerns, please call the clinic at the number listed above.       Sincerely,    Irene Payne NP

## 2024-06-06 NOTE — TELEPHONE ENCOUNTER
----- Message from PARVEEN Pacheco CNP sent at 6/6/2024  7:24 AM CDT -----  Due to language barrier (need mandarin ), can you ask the patient to come back in for clinic visit early next week to review recent lab results specifically positive h. Pylori as treatment requires multiple medications.  Can book in blocked off time slots.

## 2024-06-10 NOTE — TELEPHONE ENCOUNTER
Left another message with mandarin  for patient to return call. Will also send letter with results.     Lili LUCAS CMA on Eugenia 10, 2024 at 2:02 PM

## 2024-06-11 ENCOUNTER — HOSPITAL ENCOUNTER (OUTPATIENT)
Dept: MAMMOGRAPHY | Facility: CLINIC | Age: 44
Discharge: HOME | End: 2024-06-11
Payer: COMMERCIAL

## 2024-06-11 DIAGNOSIS — N63.21 MASS OF UPPER OUTER QUADRANT OF LEFT BREAST: ICD-10-CM

## 2024-06-11 PROCEDURE — 76642 ULTRASOUND BREAST LIMITED: CPT | Mod: LT

## 2024-06-11 PROCEDURE — 77062 BREAST TOMOSYNTHESIS BI: CPT

## 2024-06-15 ENCOUNTER — HOSPITAL ENCOUNTER (OUTPATIENT)
Dept: CT IMAGING | Facility: CLINIC | Age: 44
Discharge: HOME | End: 2024-06-15
Payer: COMMERCIAL

## 2024-06-15 DIAGNOSIS — R93.89 ABNORMAL CT OF THE CHEST: ICD-10-CM

## 2024-06-15 PROCEDURE — 71250 CT THORAX DX C-: CPT

## 2024-06-26 ENCOUNTER — TELEPHONE (OUTPATIENT)
Dept: FAMILY MEDICINE | Facility: CLINIC | Age: 44
End: 2024-06-26
Payer: COMMERCIAL

## 2024-06-26 NOTE — TELEPHONE ENCOUNTER
Reason for Call:  Appointment Request    Patient requesting this type of appt:  follow up     Requested provider:  Irene Payne     Reason patient unable to be scheduled:  Needed in two weeks     When does patient want to be seen/preferred time: 1-2 weeks    Comments: Ernesto called to schedule a follow up appointment for [place] and not exactly sure if she needs an appointment with Irene Payne or just for lab work.     Okay to leave a detailed message?: Yes at Other phone number:  [phone]    Call taken on 6/26/2024 at 1:43 PM by Irene Lucas

## 2024-06-27 NOTE — TELEPHONE ENCOUNTER
Spoke with Ernesto and scheduled appointment for patient.     Lili LUCAS CMA on June 27, 2024 at 10:02 AM

## 2024-07-02 ENCOUNTER — TELEPHONE (OUTPATIENT)
Dept: FAMILY MEDICINE | Facility: CLINIC | Age: 44
End: 2024-07-02

## 2024-07-02 ENCOUNTER — OFFICE VISIT (OUTPATIENT)
Dept: FAMILY MEDICINE | Facility: CLINIC | Age: 44
End: 2024-07-02
Payer: COMMERCIAL

## 2024-07-02 VITALS
HEART RATE: 72 BPM | HEIGHT: 65 IN | SYSTOLIC BLOOD PRESSURE: 100 MMHG | BODY MASS INDEX: 24.32 KG/M2 | WEIGHT: 146 LBS | DIASTOLIC BLOOD PRESSURE: 64 MMHG | RESPIRATION RATE: 20 BRPM | OXYGEN SATURATION: 100 % | TEMPERATURE: 97.8 F

## 2024-07-02 DIAGNOSIS — A04.8 H. PYLORI INFECTION: Primary | ICD-10-CM

## 2024-07-02 DIAGNOSIS — E55.9 VITAMIN D DEFICIENCY: ICD-10-CM

## 2024-07-02 DIAGNOSIS — R91.8 PULMONARY NODULES: ICD-10-CM

## 2024-07-02 LAB
BASOPHILS # BLD AUTO: 0 10E3/UL (ref 0–0.2)
BASOPHILS NFR BLD AUTO: 1 %
EOSINOPHIL # BLD AUTO: 0.2 10E3/UL (ref 0–0.7)
EOSINOPHIL NFR BLD AUTO: 4 %
ERYTHROCYTE [DISTWIDTH] IN BLOOD BY AUTOMATED COUNT: 15 % (ref 10–15)
HCT VFR BLD AUTO: 33.5 % (ref 35–47)
HGB BLD-MCNC: 10.6 G/DL (ref 11.7–15.7)
IMM GRANULOCYTES # BLD: 0 10E3/UL
IMM GRANULOCYTES NFR BLD: 0 %
LYMPHOCYTES # BLD AUTO: 1.3 10E3/UL (ref 0.8–5.3)
LYMPHOCYTES NFR BLD AUTO: 26 %
MCH RBC QN AUTO: 24.8 PG (ref 26.5–33)
MCHC RBC AUTO-ENTMCNC: 31.6 G/DL (ref 31.5–36.5)
MCV RBC AUTO: 79 FL (ref 78–100)
MONOCYTES # BLD AUTO: 0.4 10E3/UL (ref 0–1.3)
MONOCYTES NFR BLD AUTO: 8 %
NEUTROPHILS # BLD AUTO: 3.1 10E3/UL (ref 1.6–8.3)
NEUTROPHILS NFR BLD AUTO: 61 %
PLATELET # BLD AUTO: 261 10E3/UL (ref 150–450)
RBC # BLD AUTO: 4.27 10E6/UL (ref 3.8–5.2)
WBC # BLD AUTO: 5 10E3/UL (ref 4–11)

## 2024-07-02 PROCEDURE — 85025 COMPLETE CBC W/AUTO DIFF WBC: CPT

## 2024-07-02 PROCEDURE — 99214 OFFICE O/P EST MOD 30 MIN: CPT

## 2024-07-02 PROCEDURE — 36415 COLL VENOUS BLD VENIPUNCTURE: CPT

## 2024-07-02 RX ORDER — BISMUTH SUBCITRATE POTASSIUM, METRONIDAZOLE, TETRACYCLINE HYDROCHLORIDE 140; 125; 125 MG/1; MG/1; MG/1
3 CAPSULE ORAL
Qty: 168 CAPSULE | Refills: 0 | Status: SHIPPED | OUTPATIENT
Start: 2024-07-02 | End: 2024-07-02

## 2024-07-02 RX ORDER — METRONIDAZOLE 250 MG/1
250 TABLET ORAL 4 TIMES DAILY
Qty: 56 TABLET | Refills: 0 | Status: SHIPPED | OUTPATIENT
Start: 2024-07-02 | End: 2024-08-01

## 2024-07-02 RX ORDER — TETRACYCLINE HYDROCHLORIDE 500 MG/1
500 CAPSULE ORAL 4 TIMES DAILY
Qty: 56 CAPSULE | Refills: 0 | Status: SHIPPED | OUTPATIENT
Start: 2024-07-02 | End: 2024-08-01

## 2024-07-02 RX ORDER — ERGOCALCIFEROL 1.25 MG/1
50000 CAPSULE, LIQUID FILLED ORAL WEEKLY
Qty: 52 CAPSULE | Refills: 0 | Status: SHIPPED | OUTPATIENT
Start: 2024-07-02 | End: 2024-09-03

## 2024-07-02 ASSESSMENT — PAIN SCALES - GENERAL: PAINLEVEL: NO PAIN (0)

## 2024-07-02 NOTE — PROGRESS NOTES
Assessment & Plan     H. pylori infection  Three capsules (bismuth subcitrate 420 mg, metronidazole 375 mg, and tetracycline 375 mg) 4 times daily after meals and at bedtime for 14 days--total of 12 pills daily AND 20 mg omeprazole twice daily (morning and evening) for 14 day--total of 2 pills daily.  Avoid alcohol use during antibiotic administration and one week after.  Return back at least 4 weeks after completion of antibiotic treatment.  If the combination medication is too expensive please call back clinic at [phone] and will order mediations individually.  Baseline CBC r/t metronidazole administration.  Will obtain CBC after treatment.   - bis subcit-metronidazole-tetracycline (PYLERA) 140-125-125 MG capsule; Take 3 capsules by mouth 4 times daily (with meals and nightly)  - omeprazole (PRILOSEC) 20 MG DR capsule; Take 1 capsule (20 mg) by mouth 2 times daily  - CBC with platelets and differential    Vitamin D deficiency  6/2023 vitamin D level 18.    - vitamin D2 (ERGOCALCIFEROL) 57750 units (1250 mcg) capsule; Take 1 capsule (50,000 Units) by mouth once a week    Pulmonary nodule   Repeat CT chest to be completed in December 2024.     Magnus Valladares is a 44 year old, presenting for the following health issues:  Follow Up (H-pylori treatment)      7/2/2024     8:45 AM   Additional Questions   Roomed by Lili LUCAS CMA     Via the Health Maintenance questionnaire, the patient has reported the following services have been completed -Colonscopy: [place] 2024-04-15, this information has been sent to the abstraction team.  History of Present Illness       Reason for visit:  H-pylori    She eats 0-1 servings of fruits and vegetables daily.She consumes 0 sweetened beverage(s) daily.She exercises with enough effort to increase her heart rate 60 or more minutes per day.  She exercises with enough effort to increase her heart rate 4 days per week.   She is taking medications regularly.     Patient is a 44 year  old female presenting for H pylori treatment.  Medical history includes vitamin D deficiency, anemia, hepatitis B carrier.  Phone  used during visit.     6/4/2024 H pylori antigen stool positive.  After several attempts through phone calls and results letter, was able to contact patient and set up appointment to review H. pylori treatment.  With language barrier believed that in person visit would be more beneficial as H. pylori treatment requires several medications and follow-up.  Denies abdominal pain, nausea, vomiting, diarrhea, fever, chills, decreased appetite.     6/15/2024 chest CT shows 0.4 cm pulmonary nodule in left lower lobe and 0.9 x 0.5 cm ground glass opacity found in right upper lobe which could indicate infectious or inflammatory disease or possibly adenocarcinoma-lung cancer.  It was recommended to repeat CT in 6 months.  Denies cough, fever, chills, difficulty breathing, shortness of breath, hemoptysis, night sweats, unintentional weight loss.     6/11/2024 US breast left shows probably benign mass within the left breast at the 10:30 o'clock position is overall unchanged in both size and appearance compared to the prior examination. Recommend additional follow-up ultrasound in one year to document at least 2 years of stability. The patient will be due for bilateral mammographic views at that time as well.    Review of Systems  Review of systems negative otherwise known HPI.    Current Outpatient Medications   Medication Sig Dispense Refill    bis subcit-metronidazole-tetracycline (PYLERA) 140-125-125 MG capsule Take 3 capsules by mouth 4 times daily (with meals and nightly) 168 capsule 0    omeprazole (PRILOSEC) 20 MG DR capsule Take 1 capsule (20 mg) by mouth 2 times daily 28 capsule 0    vitamin D2 (ERGOCALCIFEROL) 99345 units (1250 mcg) capsule Take 1 capsule (50,000 Units) by mouth once a week 52 capsule 0     No current facility-administered medications for this visit.      "  Objective    /64 (BP Location: Left arm, Patient Position: Sitting, Cuff Size: Adult Regular)   Pulse 72   Temp 97.8  F (36.6  C) (Temporal)   Resp 20   Ht 1.644 m (5' 4.72\")   Wt 66.2 kg (146 lb)   LMP 05/31/2024 (Approximate)   SpO2 100%   BMI 24.51 kg/m    Body mass index is 24.51 kg/m .  Physical Exam   General: Alert, oriented, no acute distress.    Head: Normocephalic and atraumatic.   Eyes: Conjunctiva and sclera clear.   Respiratory: Lungs clear, unlabored. No rales, rhonchi, or wheezes.    Cardiovascular: Regular rate and rhythm, S1 and S2. No murmurs. No peripheral edema.     Gastrointestinal: Normoactive bowel sounds. Soft, nontender, no organomegaly.     Neurologic: No gross motor or sensory deficit. Mentation intact and speech normal.     Psychiatric: Appropriate affect.     Signed Electronically by: PARVEEN Pacheco CNP    "

## 2024-07-02 NOTE — TELEPHONE ENCOUNTER
Pharmacy calling because medication sent to pharmacy today Pylera but it is $600 because it is a non-preferred rx.     Pharmacy is wondering if there is another option for patient or if medication can be sent as the 3 separate rx orders that is in the pylera rx.    Aicha Li, SUNSHINEN, RN  [facility]

## 2024-07-02 NOTE — LETTER
July 2, 2024      Blaine Gonzalez  [address] [place] [address]        Dear ,    We are writing to inform you of your test results.    Complete blood count (CBC) within normal limits. Slight decrease in hemoglobin and hematocrit, will continue to monitor. Incorporate iron rich foods to increase hemoglobin. Iron rich foods include iron fortified bread and breakfast cereal, nuts/seeds, dried fruit, legumes (beans, lentils, chickpeas), dark leafy green vegetables (spinach, beet, broccoli, kale), meet (chicken, ham, turkey, pork, eggs), tofu. Will repeat CBC at follow up visit in 1-2 months.     Resulted Orders   CBC with platelets and differential   Result Value Ref Range    WBC Count 5.0 4.0 - 11.0 10e3/uL    RBC Count 4.27 3.80 - 5.20 10e6/uL    Hemoglobin 10.6 (L) 11.7 - 15.7 g/dL    Hematocrit 33.5 (L) 35.0 - 47.0 %    MCV 79 78 - 100 fL    MCH 24.8 (L) 26.5 - 33.0 pg    MCHC 31.6 31.5 - 36.5 g/dL    RDW 15.0 10.0 - 15.0 %    Platelet Count 261 150 - 450 10e3/uL    % Neutrophils 61 %    % Lymphocytes 26 %    % Monocytes 8 %    % Eosinophils 4 %    % Basophils 1 %    % Immature Granulocytes 0 %    Absolute Neutrophils 3.1 1.6 - 8.3 10e3/uL    Absolute Lymphocytes 1.3 0.8 - 5.3 10e3/uL    Absolute Monocytes 0.4 0.0 - 1.3 10e3/uL    Absolute Eosinophils 0.2 0.0 - 0.7 10e3/uL    Absolute Basophils 0.0 0.0 - 0.2 10e3/uL    Absolute Immature Granulocytes 0.0 <=0.4 10e3/uL       If you have any questions or concerns, please call the clinic at the number listed above.       Sincerely,      PARVEEN Pacheco CNP

## 2024-07-02 NOTE — TELEPHONE ENCOUNTER
Writer called pharmacy who confirmed they did receive individual orders for patient and will work on filling them    KAVYA Sosa, RN  [facility]

## 2024-07-02 NOTE — PATIENT INSTRUCTIONS
Three capsules (bismuth subcitrate 420 mg, metronidazole 375 mg, and tetracycline 375 mg) 4 times daily after meals and at bedtime for 14 days---total of 12 pills daily.   AND  20 mg omeprazole twice daily (morning and evening) for 14 day---total of 2 pills daily.     Avoid alcohol use during antibiotic administration and one week after.     Return back at least 4 weeks after completion of antibiotic treatment.    Repeat CT chest to be completed in December 2024.     If the medication is too expensive please call back clinic at [phone].

## 2024-08-01 ENCOUNTER — OFFICE VISIT (OUTPATIENT)
Dept: FAMILY MEDICINE | Facility: CLINIC | Age: 44
End: 2024-08-01
Payer: COMMERCIAL

## 2024-08-01 VITALS
WEIGHT: 143.6 LBS | DIASTOLIC BLOOD PRESSURE: 87 MMHG | BODY MASS INDEX: 23.93 KG/M2 | RESPIRATION RATE: 14 BRPM | HEART RATE: 75 BPM | OXYGEN SATURATION: 98 % | TEMPERATURE: 98.2 F | SYSTOLIC BLOOD PRESSURE: 114 MMHG | HEIGHT: 65 IN

## 2024-08-01 DIAGNOSIS — N63.21 MASS OF UPPER OUTER QUADRANT OF LEFT BREAST: ICD-10-CM

## 2024-08-01 DIAGNOSIS — N63.23 MASS OF LOWER OUTER QUADRANT OF LEFT BREAST: ICD-10-CM

## 2024-08-01 DIAGNOSIS — Z13.1 SCREENING FOR DIABETES MELLITUS: ICD-10-CM

## 2024-08-01 DIAGNOSIS — N89.8 VAGINAL DISCHARGE: ICD-10-CM

## 2024-08-01 DIAGNOSIS — E78.2 MIXED HYPERLIPIDEMIA: ICD-10-CM

## 2024-08-01 DIAGNOSIS — B37.31 YEAST INFECTION OF THE VAGINA: Primary | ICD-10-CM

## 2024-08-01 DIAGNOSIS — Z00.00 ROUTINE HISTORY AND PHYSICAL EXAMINATION OF ADULT: Primary | ICD-10-CM

## 2024-08-01 DIAGNOSIS — B18.1 HEPATITIS B CARRIER (H): ICD-10-CM

## 2024-08-01 DIAGNOSIS — A04.8 H. PYLORI INFECTION: ICD-10-CM

## 2024-08-01 DIAGNOSIS — E55.9 VITAMIN D DEFICIENCY: ICD-10-CM

## 2024-08-01 DIAGNOSIS — R91.8 PULMONARY NODULES: ICD-10-CM

## 2024-08-01 LAB
CHOLEST SERPL-MCNC: 241 MG/DL
CLUE CELLS: ABNORMAL
ERYTHROCYTE [DISTWIDTH] IN BLOOD BY AUTOMATED COUNT: 15.3 % (ref 10–15)
FASTING STATUS PATIENT QL REPORTED: ABNORMAL
HCT VFR BLD AUTO: 33.9 % (ref 35–47)
HDLC SERPL-MCNC: 79 MG/DL
HGB BLD-MCNC: 10.5 G/DL (ref 11.7–15.7)
HIV 1+2 AB+HIV1 P24 AG SERPL QL IA: NONREACTIVE
LDLC SERPL CALC-MCNC: 145 MG/DL
MCH RBC QN AUTO: 24.2 PG (ref 26.5–33)
MCHC RBC AUTO-ENTMCNC: 31 G/DL (ref 31.5–36.5)
MCV RBC AUTO: 78 FL (ref 78–100)
NONHDLC SERPL-MCNC: 162 MG/DL
PLATELET # BLD AUTO: 272 10E3/UL (ref 150–450)
RBC # BLD AUTO: 4.34 10E6/UL (ref 3.8–5.2)
T PALLIDUM AB SER QL: NONREACTIVE
TRICHOMONAS, WET PREP: ABNORMAL
TRIGL SERPL-MCNC: 85 MG/DL
VIT D+METAB SERPL-MCNC: 30 NG/ML (ref 20–50)
WBC # BLD AUTO: 4.2 10E3/UL (ref 4–11)
WBC'S/HIGH POWER FIELD, WET PREP: ABNORMAL
YEAST, WET PREP: PRESENT

## 2024-08-01 PROCEDURE — 87210 SMEAR WET MOUNT SALINE/INK: CPT

## 2024-08-01 PROCEDURE — 82306 VITAMIN D 25 HYDROXY: CPT

## 2024-08-01 PROCEDURE — 99214 OFFICE O/P EST MOD 30 MIN: CPT | Mod: 25

## 2024-08-01 PROCEDURE — 36415 COLL VENOUS BLD VENIPUNCTURE: CPT

## 2024-08-01 PROCEDURE — 86780 TREPONEMA PALLIDUM: CPT

## 2024-08-01 PROCEDURE — 87491 CHLMYD TRACH DNA AMP PROBE: CPT

## 2024-08-01 PROCEDURE — 87389 HIV-1 AG W/HIV-1&-2 AB AG IA: CPT

## 2024-08-01 PROCEDURE — 87591 N.GONORRHOEAE DNA AMP PROB: CPT

## 2024-08-01 PROCEDURE — 80061 LIPID PANEL: CPT

## 2024-08-01 PROCEDURE — 99396 PREV VISIT EST AGE 40-64: CPT

## 2024-08-01 PROCEDURE — 85027 COMPLETE CBC AUTOMATED: CPT

## 2024-08-01 RX ORDER — FLUCONAZOLE 150 MG/1
150 TABLET ORAL ONCE
Qty: 1 TABLET | Refills: 0 | Status: CANCELLED | OUTPATIENT
Start: 2024-08-01 | End: 2024-08-01

## 2024-08-01 RX ORDER — HYDROCORTISONE 2.5 %
CREAM (GRAM) TOPICAL 2 TIMES DAILY
COMMUNITY
End: 2024-08-01

## 2024-08-01 RX ORDER — FLUCONAZOLE 150 MG/1
150 TABLET ORAL ONCE
Qty: 2 TABLET | Refills: 0 | Status: SHIPPED | OUTPATIENT
Start: 2024-08-01 | End: 2024-08-01

## 2024-08-01 ASSESSMENT — PAIN SCALES - GENERAL: PAINLEVEL: NO PAIN (0)

## 2024-08-01 NOTE — LETTER
September 3, 2024      Blaine Gonzalez  [address] [place] [address]        Dear ,    We are writing to inform you of your test results.    Most recent H pylori (stool sample) was negative for H. Pylori infection. This is good, means the medications you took previously eradicated the infection.     Elements of your CBC (complete blood count) remain decreased. Most likely consistent with iron deficiency anemia. Have you ever been told you had iron deficiency anemia. I usually like to check ferritin and iron studies levels before starting on iron supplementation. Unfortunately I was unable to add those labs on to the most recent lab draw as it is a different colored tube. You may come back to get those additional labs done at lab only visit. Or you can start iron supplementation: take 325 mg iron supplement daily with water or orange juice on an empty stomach. Avoid taking with antacids or calcium-rich foods. May turn stool black and cause constipation.  May use stool softener if experiencing constipation. If you start an iron supplementation, we will need to recheck your CBC in one month.     Iron rich foods include iron fortified bread and breakfast cereal, nuts/seeds, dried fruit, legumes (beans, lentils, chickpeas), dark leafy green vegetables (spinach, beet, broccoli, kale), meet (chicken, ham, turkey, pork, eggs), tofu.     Resulted Orders   Vaginal-Chlamydia trachomatis/Neisseria gonorrhoeae by PCR   Result Value Ref Range    Chlamydia Trachomatis Negative Negative      Comment:      Negative for C. trachomatis rRNA by transcription mediated amplification.   A negative result by transcription mediated amplification does not preclude the presence of infection because results are dependent on proper and adequate collection, absence of inhibitors and sufficient rRNA to be detected.    Neisseria gonorrhoeae Negative Negative      Comment:      Negative for N. gonorrhoeae rRNA by transcription mediated  amplification. A negative result by transcription mediated amplification does not preclude the presence of C. trachomatis infection because results are dependent on proper and adequate collection, absence of inhibitors and sufficient rRNA to be detected.   Vagina-Wet preparation   Result Value Ref Range    Trichomonas Absent Absent    Yeast Present (A) Absent    Clue Cells Absent Absent    WBCs/high power field 2+ (A) None   Lipid panel reflex to direct LDL Fasting   Result Value Ref Range    Cholesterol 241 (H) <200 mg/dL    Triglycerides 85 <150 mg/dL    Direct Measure HDL 79 >=50 mg/dL    LDL Cholesterol Calculated 145 (H) <=100 mg/dL    Non HDL Cholesterol 162 (H) <130 mg/dL    Patient Fasting > 8hrs? Unknown     Narrative    Cholesterol  Desirable:  <200 mg/dL    Triglycerides  Normal:  Less than 150 mg/dL  Borderline High:  150-199 mg/dL  High:  200-499 mg/dL  Very High:  Greater than or equal to 500 mg/dL    Direct Measure HDL  Female:  Greater than or equal to 50 mg/dL   Male:  Greater than or equal to 40 mg/dL    LDL Cholesterol  Desirable:  <100mg/dL  Above Desirable:  100-129 mg/dL   Borderline High:  130-159 mg/dL   High:  160-189 mg/dL   Very High:  >= 190 mg/dL    Non HDL Cholesterol  Desirable:  130 mg/dL  Above Desirable:  130-159 mg/dL  Borderline High:  160-189 mg/dL  High:  190-219 mg/dL  Very High:  Greater than or equal to 220 mg/dL   Vitamin D Deficiency   Result Value Ref Range    Vitamin D, Total (25-Hydroxy) 30 20 - 50 ng/mL      Comment:      optimum levels    Narrative    Season, race, dietary intake, and treatment affect the concentration of 25-hydroxy-Vitamin D. Values may decrease during winter months and increase during summer months.    Vitamin D determination is routinely performed by an immunoassay specific for 25 hydroxyvitamin D3.  If an individual is on vitamin D2(ergocalciferol) supplementation, please specify 25 OH vitamin D2 and D3 level determination by LCMSMS test VITD23.      HIV Antigen Antibody Combo Cascade   Result Value Ref Range    HIV Antigen Antibody Combo Nonreactive Nonreactive      Comment:      Negative HIV-1 p24 antigen and HIV-1/2 antibody screening test results usually indicate the absence of HIV-1 and HIV-2 infection. However, such negative results do not rule-out acute HIV infection.  If acute HIV-1 or HIV-2 infection is suspected, detection of HIV-1 or HIV-2 RNA  is recommended.    Treponema Abs w Reflex to RPR and Titer   Result Value Ref Range    Treponema Antibody Total Nonreactive Nonreactive   Helicobacter pylori Antigen Stool   Result Value Ref Range    Helicobacter pylori Antigen Stool Negative Negative      Comment:      Negative for Helicobacter pylori antigen by enzyme immunoassay. A negative result indicates the absence of H. pylori antigen or that the level of antigen is below the level of detection.   CBC with platelets   Result Value Ref Range    WBC Count 4.2 4.0 - 11.0 10e3/uL    RBC Count 4.34 3.80 - 5.20 10e6/uL    Hemoglobin 10.5 (L) 11.7 - 15.7 g/dL    Hematocrit 33.9 (L) 35.0 - 47.0 %    MCV 78 78 - 100 fL    MCH 24.2 (L) 26.5 - 33.0 pg    MCHC 31.0 (L) 31.5 - 36.5 g/dL    RDW 15.3 (H) 10.0 - 15.0 %    Platelet Count 272 150 - 450 10e3/uL       If you have any questions or concerns, please call the clinic at the number listed above.       Sincerely,      PARVEEN Pacheco CNP

## 2024-08-01 NOTE — LETTER
August 29, 2024      Blaine Gonzalez  [address] [place] [address]        Dear ,    We are writing to inform you of your test results.    {results letter list:[id number]}    Resulted Orders   Vaginal-Chlamydia trachomatis/Neisseria gonorrhoeae by PCR   Result Value Ref Range    Chlamydia Trachomatis Negative Negative      Comment:      Negative for C. trachomatis rRNA by transcription mediated amplification.   A negative result by transcription mediated amplification does not preclude the presence of infection because results are dependent on proper and adequate collection, absence of inhibitors and sufficient rRNA to be detected.    Neisseria gonorrhoeae Negative Negative      Comment:      Negative for N. gonorrhoeae rRNA by transcription mediated amplification. A negative result by transcription mediated amplification does not preclude the presence of C. trachomatis infection because results are dependent on proper and adequate collection, absence of inhibitors and sufficient rRNA to be detected.   Vagina-Wet preparation   Result Value Ref Range    Trichomonas Absent Absent    Yeast Present (A) Absent    Clue Cells Absent Absent    WBCs/high power field 2+ (A) None   Lipid panel reflex to direct LDL Fasting   Result Value Ref Range    Cholesterol 241 (H) <200 mg/dL    Triglycerides 85 <150 mg/dL    Direct Measure HDL 79 >=50 mg/dL    LDL Cholesterol Calculated 145 (H) <=100 mg/dL    Non HDL Cholesterol 162 (H) <130 mg/dL    Patient Fasting > 8hrs? Unknown     Narrative    Cholesterol  Desirable:  <200 mg/dL    Triglycerides  Normal:  Less than 150 mg/dL  Borderline High:  150-199 mg/dL  High:  200-499 mg/dL  Very High:  Greater than or equal to 500 mg/dL    Direct Measure HDL  Female:  Greater than or equal to 50 mg/dL   Male:  Greater than or equal to 40 mg/dL    LDL Cholesterol  Desirable:  <100mg/dL  Above Desirable:  100-129 mg/dL   Borderline High:  130-159 mg/dL   High:  160-189 mg/dL   Very High:  >= 190  mg/dL    Non HDL Cholesterol  Desirable:  130 mg/dL  Above Desirable:  130-159 mg/dL  Borderline High:  160-189 mg/dL  High:  190-219 mg/dL  Very High:  Greater than or equal to 220 mg/dL   Vitamin D Deficiency   Result Value Ref Range    Vitamin D, Total (25-Hydroxy) 30 20 - 50 ng/mL      Comment:      optimum levels    Narrative    Season, race, dietary intake, and treatment affect the concentration of 25-hydroxy-Vitamin D. Values may decrease during winter months and increase during summer months.    Vitamin D determination is routinely performed by an immunoassay specific for 25 hydroxyvitamin D3.  If an individual is on vitamin D2(ergocalciferol) supplementation, please specify 25 OH vitamin D2 and D3 level determination by LCMSMS test [id number].     HIV Antigen Antibody Combo Cascade   Result Value Ref Range    HIV Antigen Antibody Combo Nonreactive Nonreactive      Comment:      Negative HIV-1 p24 antigen and HIV-1/2 antibody screening test results usually indicate the absence of HIV-1 and HIV-2 infection. However, such negative results do not rule-out acute HIV infection.  If acute HIV-1 or HIV-2 infection is suspected, detection of HIV-1 or HIV-2 RNA  is recommended.    Treponema Abs w Reflex to RPR and Titer   Result Value Ref Range    Treponema Antibody Total Nonreactive Nonreactive   Helicobacter pylori Antigen Stool   Result Value Ref Range    Helicobacter pylori Antigen Stool Negative Negative      Comment:      Negative for Helicobacter pylori antigen by enzyme immunoassay. A negative result indicates the absence of H. pylori antigen or that the level of antigen is below the level of detection.   CBC with platelets   Result Value Ref Range    WBC Count 4.2 4.0 - 11.0 10e3/uL    RBC Count 4.34 3.80 - 5.20 10e6/uL    Hemoglobin 10.5 (L) 11.7 - 15.7 g/dL    Hematocrit 33.9 (L) 35.0 - 47.0 %    MCV 78 78 - 100 fL    MCH 24.2 (L) 26.5 - 33.0 pg    MCHC 31.0 (L) 31.5 - 36.5 g/dL    RDW 15.3 (H) 10.0 - 15.0 %     Platelet Count 272 150 - 450 10e3/uL       If you have any questions or concerns, please call the clinic at the number listed above.       Sincerely,      PARVEEN Pacheco CNP

## 2024-08-01 NOTE — LETTER
August 7, 2024      Blaine Gonzalez  [address] [place] [address]        Dear ,    We are writing to inform you of your test results.    Positive for yeast: received nurse call 8/2/2024 and sent medications.   Bacterial vaginosis testing negative.  Sexually transmitted disease testing negative: chlamydia, gonorrhea, syphilis, HIV    Vitamin D level within normal limits.     CBC results decreased. Should recheck CBC in 1-2 months Please call [phone] to schedule lab visit.     Elements of lipid panel elevated.   To improve your cholesterol:  Limit red meat, fatty dairy (whole milk, butter, and ice cream), and fried foods.  Limit sugary foods and beverages (juice, pop, and sports drinks).  Eat more fruits, vegetables, whole grain (brown rice, whole wheat bread, and oatmeal), fish, and nuts.  Exercise goal is 150 minutes of moderate intensity exercise per week.        Resulted Orders   Vaginal-Chlamydia trachomatis/Neisseria gonorrhoeae by PCR   Result Value Ref Range    Chlamydia Trachomatis Negative Negative      Comment:      Negative for C. trachomatis rRNA by transcription mediated amplification.   A negative result by transcription mediated amplification does not preclude the presence of infection because results are dependent on proper and adequate collection, absence of inhibitors and sufficient rRNA to be detected.    Neisseria gonorrhoeae Negative Negative      Comment:      Negative for N. gonorrhoeae rRNA by transcription mediated amplification. A negative result by transcription mediated amplification does not preclude the presence of C. trachomatis infection because results are dependent on proper and adequate collection, absence of inhibitors and sufficient rRNA to be detected.   Vagina-Wet preparation   Result Value Ref Range    Trichomonas Absent Absent    Yeast Present (A) Absent    Clue Cells Absent Absent    WBCs/high power field 2+ (A) None   Lipid panel reflex to direct LDL Fasting    Result Value Ref Range    Cholesterol 241 (H) <200 mg/dL    Triglycerides 85 <150 mg/dL    Direct Measure HDL 79 >=50 mg/dL    LDL Cholesterol Calculated 145 (H) <=100 mg/dL    Non HDL Cholesterol 162 (H) <130 mg/dL    Patient Fasting > 8hrs? Unknown     Narrative    Cholesterol  Desirable:  <200 mg/dL    Triglycerides  Normal:  Less than 150 mg/dL  Borderline High:  150-199 mg/dL  High:  200-499 mg/dL  Very High:  Greater than or equal to 500 mg/dL    Direct Measure HDL  Female:  Greater than or equal to 50 mg/dL   Male:  Greater than or equal to 40 mg/dL    LDL Cholesterol  Desirable:  <100mg/dL  Above Desirable:  100-129 mg/dL   Borderline High:  130-159 mg/dL   High:  160-189 mg/dL   Very High:  >= 190 mg/dL    Non HDL Cholesterol  Desirable:  130 mg/dL  Above Desirable:  130-159 mg/dL  Borderline High:  160-189 mg/dL  High:  190-219 mg/dL  Very High:  Greater than or equal to 220 mg/dL   Vitamin D Deficiency   Result Value Ref Range    Vitamin D, Total (25-Hydroxy) 30 20 - 50 ng/mL      Comment:      optimum levels    Narrative    Season, race, dietary intake, and treatment affect the concentration of 25-hydroxy-Vitamin D. Values may decrease during winter months and increase during summer months.    Vitamin D determination is routinely performed by an immunoassay specific for 25 hydroxyvitamin D3.  If an individual is on vitamin D2(ergocalciferol) supplementation, please specify 25 OH vitamin D2 and D3 level determination by LCMSMS test VITD23.     HIV Antigen Antibody Combo Cascade   Result Value Ref Range    HIV Antigen Antibody Combo Nonreactive Nonreactive      Comment:      Negative HIV-1 p24 antigen and HIV-1/2 antibody screening test results usually indicate the absence of HIV-1 and HIV-2 infection. However, such negative results do not rule-out acute HIV infection.  If acute HIV-1 or HIV-2 infection is suspected, detection of HIV-1 or HIV-2 RNA  is recommended.    Treponema Abs w Reflex to RPR and  Titer   Result Value Ref Range    Treponema Antibody Total Nonreactive Nonreactive   CBC with platelets   Result Value Ref Range    WBC Count 4.2 4.0 - 11.0 10e3/uL    RBC Count 4.34 3.80 - 5.20 10e6/uL    Hemoglobin 10.5 (L) 11.7 - 15.7 g/dL    Hematocrit 33.9 (L) 35.0 - 47.0 %    MCV 78 78 - 100 fL    MCH 24.2 (L) 26.5 - 33.0 pg    MCHC 31.0 (L) 31.5 - 36.5 g/dL    RDW 15.3 (H) 10.0 - 15.0 %    Platelet Count 272 150 - 450 10e3/uL       If you have any questions or concerns, please call the clinic at the number listed above.       Sincerely,      PARVEEN Pacheco CNP

## 2024-08-01 NOTE — PROGRESS NOTES
Preventive Care Visit  [facility] VALERYPARVEEN Warren CNP, Family Medicine  Aug 1, 2024      Assessment & Plan     Routine history and physical examination of adult  Screening labs today per maintenance, age, risk assessment, and to promote patient wellbeing.  6/2023 Pap exam with HPV testing negative.  6/2024 glucose 86.  Blood pressure in clinic 114/87.  - REVIEW OF HEALTH MAINTENANCE PROTOCOL ORDERS    Hepatitis B carrier (H)    Mass of upper outer quadrant of left breast  6/2023 diagnostic mammogram and ultrasound of left breast completed: Targeted left breast ultrasound at the 10:30 o'clock position, 7 cm from the nipple demonstrates a 1.6 x 0.7 x 1.7 cm oval, hypoechoic mass with angular margins. The probably benign mass within the left upper inner quadrant at posterior depth is overall mammographically stable compared to the prior examination. Otherwise, there are no new or suspicious masses, calcifications, or architectural distortion within either breast.    H. pylori infection  Completed bismuth quadruple treatment last week, unsure of date.  Will recheck stool week of August 26 to confirm eradication.  Will obtain CBC posttreatment related to metronidazole use.   - Helicobacter pylori Antigen Stool; Future  - CBC with platelets; Future    Vitamin D deficiency  6/2023 vitamin D level 18.  Takes vitamin D supplementation.    - Vitamin D Deficiency; Future    Pulmonary nodules  6/2024 CT chest:  1.  0.9 x 0.5 cm groundglass opacity posterior right upper lobe. Management recommendations as detailed below. This may represent an infectious or inflammatory etiology. However, low-grade adenocarcinoma can have this appearance.  2.  Subpleural 0.4 cm solid pulmonary nodule posterior left lower lobe. Management recommendations as detailed below.  Nodule size <6mm  Low-risk patients: No follow-up needed.  High-risk patients: Optional follow-up at 12 month    Mixed hyperlipidemia  6/2023 .   Managed with lifestyle modifications.  - Lipid panel reflex to direct LDL Fasting; Future    Vaginal discharge  Reports 1 month of vaginal discharge, malodorous odor, vaginal itching.  Will rule out BV and yeast.  Will also check STD testing although low suspicion as she is - one sexual partner.   - Vaginal-Chlamydia trachomatis/Neisseria gonorrhoeae by PCR  - HIV Antigen Antibody Combo Cascade; Future  - Treponema Abs w Reflex to RPR and Titer; Future  - Vagina-Wet preparation    Counseling  Appropriate preventive services were addressed with this patient via screening, questionnaire, or discussion as appropriate for fall prevention, nutrition, physical activity, Tobacco-use cessation, weight loss and cognition.  Checklist reviewing preventive services available has been given to the patient.  Reviewed patient's diet, addressing concerns and/or questions.   The patient was instructed to see the dentist every 6 months.     Magnus Valladares is a 44 year old, presenting for the following:  Physical and Vaginal Problem (Itching, odor, discharge, >1 month)        8/1/2024    10:37 AM   Additional Questions   Roomed by [place]      Health Care Directive  Patient does not have a Health Care Directive or Living Will    Vaginal Problem     Patient is a 44 year old female presenting for annual physical exam.  Medical history includes vitamin D deficiency, anemia, hepatitis B carrier, H. Pylori infection, chronic pulmonary nodule, chronic left breast mass.  Phone  used during visit.      LMP: 7/8/2024, regular monthly menstrual cycles, duration 3-4 days   Hx of abnormal pap smear: no   Last pap smear: 6/2023, negative for malignancy  Sexually active: yes,   Contraception: ParaGard IUD placed 2016 in [place]  Concerns for STDs: vaginal discharge, vaginal itchiness.     Hyperlipidemia managed with lifestyle modifications.  Takes vitamin D supplementation for vitamin D deficiency.    [place]  "provider told patient she had bacterial infection of the stomach but was told to come back to [place] for [place].  H. pylori antigen stool positive 6/5/2024.  Completed bismuth quadruple treatment last week, took as prescribed.  Denies any side effects while taking medication.  Reports vaginal itching, white discharge and odorous smell of discharge for the past mouth, before starting H. Pylori treatment.  No history of yeast of BV.  Patient's  is having similar symptoms and was prescribed hydrocortisone cream 2.5%.  Patient has been using hydrocortisone cream with short term relief.      Benign left breast mass found in 2023.  Has not noted any changes in size of breast mass.  Couple days before menstrual cycle will experience abdominal bloating and left breast pain.  Left breast pain will resolve after menstrual cycle starts.  6/2023 diagnostic mammogram and ultrasound of left breast completed: Targeted left breast ultrasound at the 10:30 o'clock position, 7 cm from the nipple demonstrates a 1.6 x 0.7 x 1.7 cm oval, hypoechoic mass with angular margins. The probably benign mass within the left upper inner quadrant at posterior depth is overall mammographically stable compared to the prior examination. Otherwise, there are no new or suspicious masses, calcifications, or architectural distortion within either breast.    Patient had imaging of chest and abdomen completed in 4/2024 when she returned back to [place] for physical exam.  Patient reports CT chest showed nodule in left breast/left lung area- \"something dark, approximately 1 cm.\"  Denies any respiratory symptoms of cough, congestion, dyspnea, weight loss, hemoptysis.    6/2024 CT chest:  1.  0.9 x 0.5 cm groundglass opacity posterior right upper lobe. Management recommendations as detailed below. This may represent an infectious or inflammatory etiology. However, low-grade adenocarcinoma can have this appearance.  2.  Subpleural 0.4 cm solid pulmonary " nodule posterior left lower lobe. Management recommendations as detailed below.  Nodule size <6mm  Low-risk patients: No follow-up needed.  High-risk patients: Optional follow-up at 12 month        8/1/2024   General Health   How would you rate your overall physical health? Good          8/1/2024   Nutrition   Three or more servings of calcium each day? Yes   Diet: Regular (no restrictions)   How many servings of fruit and vegetables per day? (!) 2-3   How many sweetened beverages each day? (!) 2          6/14/2023   Exercise   Days per week of moderate/strenous exercise 4 days   Average minutes spent exercising at this level 60 min   Frequency of exercise: 4-5 days/week          8/1/2024   Social Factors   Worry food won't last until get money to buy more No   Food not last or not have enough money for food? No   Do you have housing? (Housing is defined as stable permanent housing and does not include staying ouside in a car, in a tent, in an abandoned building, in an overnight shelter, or couch-surfing.) No   Are you worried about losing your housing? No   Lack of transportation? No   Unable to get utilities (heat,electricity)? No   Want help with housing or utility concern? No      (!) HOUSING CONCERN PRESENT      8/1/2024   Dental   Dentist two times every year? (!) NO          8/1/2024   TB Screening   Were you born outside of the [place]? Yes         Today's PHQ-2 Score:       6/3/2024    12:46 PM   PHQ-2 ( 1999 [employer])   Q1: Little interest or pleasure in doing things 0   Q2: Feeling down, depressed or hopeless 0   PHQ-2 Score 0   Q1: Little interest or pleasure in doing things Not at all   Q2: Feeling down, depressed or hopeless Not at all   PHQ-2 Score 0         8/1/2024   Substance Use   Alcohol more than 3/day or more than 7/wk No   Do you use any other substances recreationally? No      Social History     Tobacco Use    Smoking status: Never    Smokeless tobacco: Never   Vaping Use    Vaping status: Never  Used           6/11/2024   LAST FHS-7 RESULTS   1st degree relative breast or ovarian cancer Yes   Any relative bilateral breast cancer No   Any male have breast cancer No   Any ONE woman have BOTH breast AND ovarian cancer No   Any woman with breast cancer before 50yrs Yes   2 or more relatives with breast AND/OR ovarian cancer No   2 or more relatives with breast AND/OR bowel cancer No         Mammogram Screening - Mammogram every 1-2 years updated in Health Maintenance based on mutual decision making        8/1/2024   STI Screening   New sexual partner(s) since last STI/HIV test? No      History of abnormal Pap smear: No - age 30-64 HPV with reflex Pap every 5 years recommended        Latest Ref Rng & Units 6/14/2023    10:01 AM   PAP / HPV   PAP  Negative for Intraepithelial Lesion or Malignancy (NILM)    HPV 16 DNA Negative Negative    HPV 18 DNA Negative Negative    Other HR HPV Negative Negative      ASCVD Risk   The 10-year ASCVD risk score (Rosa M LAW, et al., 2019) is: 0.7%    Values used to calculate the score:      Age: 44 years      Sex: Female      Is Non- : No      Diabetic: No      Tobacco smoker: No      Systolic Blood Pressure: 114 mmHg      Is BP treated: No      HDL Cholesterol: 69 mg/dL      Total Cholesterol: 254 mg/dL        8/1/2024   Contraception/Family Planning   Questions about contraception or family planning No         Reviewed and updated as needed this visit by Provider    No past medical history on file.  No past surgical history on file.  BP Readings from Last 3 Encounters:   08/01/24 114/87   07/02/24 100/64   06/03/24 116/80    Wt Readings from Last 3 Encounters:   08/01/24 65.1 kg (143 lb 9.6 oz)   07/02/24 66.2 kg (146 lb)   06/03/24 66.4 kg (146 lb 6.4 oz)         Current Outpatient Medications   Medication Sig Dispense Refill    vitamin D2 (ERGOCALCIFEROL) 98448 units (1250 mcg) capsule Take 1 capsule (50,000 Units) by mouth once a week 52  "capsule 0     Review of Systems  Review of systems negative otherwise known HPI.     Objective    Exam  /87 (BP Location: Left arm, Patient Position: Sitting, Cuff Size: Adult Regular)   Pulse 75   Temp 98.2  F (36.8  C) (Temporal)   Resp 14   Ht 1.649 m (5' 4.92\")   Wt 65.1 kg (143 lb 9.6 oz)   LMP 07/08/2024 (Approximate)   SpO2 98%   BMI 23.95 kg/m     Estimated body mass index is 23.95 kg/m  as calculated from the following:    Height as of this encounter: 1.649 m (5' 4.92\").    Weight as of this encounter: 65.1 kg (143 lb 9.6 oz).    Physical Exam  General: Alert, oriented, no acute distress.     Eyes: Normal external eye, conjunctiva, and lids. FARHAD.  Ears: External ear nontender. Normal landmark and color.   Oropharynx: Dentition intact. Oral and posterior pharynx pink and moist.      Neck: Supple, no masses or nodes. No adenopathy.     Respiratory: Lungs clear, unlabored. No rales, rhonchi, or wheezes.     Cardiovascular: Regular rate and rhythm. No murmurs. No peripheral edema.      Breasts: Recent mammogram and ultrasound completed.   Gastrointestinal: Normoactive bowel sounds. Soft, nontender, no organomegaly.      Musculoskeletal: No joint tenderness, deformity, or swelling.      Skin: No suspicious lesions, rashes, or abnormalities.     Neurologic: No gross motor or sensory deficit. Mentation intact and speech normal.      Psychiatric: Appropriate affect.   Genitourinary: Vulva and vagina without erythema. Cervix no lesions or cervical motion tenderness. White vaginal discharge noted.     Signed Electronically by: PARVEEN Pacheco CNP    "

## 2024-08-02 ENCOUNTER — TELEPHONE (OUTPATIENT)
Dept: FAMILY MEDICINE | Facility: CLINIC | Age: 44
End: 2024-08-02
Payer: COMMERCIAL

## 2024-08-02 LAB
C TRACH DNA SPEC QL PROBE+SIG AMP: NEGATIVE
N GONORRHOEA DNA SPEC QL NAA+PROBE: NEGATIVE

## 2024-08-02 NOTE — TELEPHONE ENCOUNTER
Writer called patient and left message to return call to clinic.     If patient returns call please relay below results per provider and route to nurse queue if patient has further questions or concerns. .    KAVYA Sosa, RN  [facility]      ----- Message from Irene Payne sent at 8/1/2024  5:52 PM CDT -----  Needs Mandarin :    Yeast positive. Take fluconazole 150 mg tablet once. If symptoms persist, take second dose of 150 mg after 72 hours of first dose.     Trichomonas negative.  Bacterial vaginosis negative.   Syphilis negative.     Will mail other results.

## 2024-08-28 ENCOUNTER — LAB (OUTPATIENT)
Dept: LAB | Facility: CLINIC | Age: 44
End: 2024-08-28
Payer: COMMERCIAL

## 2024-08-28 DIAGNOSIS — D64.9 LOW HEMOGLOBIN AND LOW HEMATOCRIT: ICD-10-CM

## 2024-08-28 LAB
ERYTHROCYTE [DISTWIDTH] IN BLOOD BY AUTOMATED COUNT: 15.6 % (ref 10–15)
HCT VFR BLD AUTO: 34 % (ref 35–47)
HGB BLD-MCNC: 10.3 G/DL (ref 11.7–15.7)
MCH RBC QN AUTO: 23.7 PG (ref 26.5–33)
MCHC RBC AUTO-ENTMCNC: 30.3 G/DL (ref 31.5–36.5)
MCV RBC AUTO: 78 FL (ref 78–100)
PLATELET # BLD AUTO: 259 10E3/UL (ref 150–450)
RBC # BLD AUTO: 4.34 10E6/UL (ref 3.8–5.2)
WBC # BLD AUTO: 4.2 10E3/UL (ref 4–11)

## 2024-08-28 PROCEDURE — 87338 HPYLORI STOOL AG IA: CPT

## 2024-08-28 PROCEDURE — 36415 COLL VENOUS BLD VENIPUNCTURE: CPT

## 2024-08-28 PROCEDURE — 85027 COMPLETE CBC AUTOMATED: CPT

## 2024-08-29 LAB — H PYLORI AG STL QL IA: NEGATIVE

## 2024-08-29 NOTE — RESULT ENCOUNTER NOTE
Please call patient to review H pylori and CBD results.     Most recent H pylori (stool sample) was negative for H. Pylori infection. This is good, means the medications you took previously eradicated the infection.     Can you check with the patient if she was ever told she had iron deficiency anemia?  Elements of your CBC (complete blood count) remain decreased. Most likely consistent with iron deficiency anemia. I usually like to check ferritin and iron studies levels before starting on PO iron supplementation. Unfortunately I was unable to add those labs on to the most recent lab draw as it is a different colored tube. She may come back to get those additional labs done at lab only visit. Or she can start iron supplementation: take 325 mg iron supplement daily with water or orange juice on an empty stomach. Avoid taking with antacids or calcium-rich foods. May turn stool black and cause constipation.  May use stool softener if experiencing constipation. If she starts iron supplementation, need to recheck her CBC in one month.     Iron rich foods include iron fortified bread and breakfast cereal, nuts/seeds, dried fruit, legumes (beans, lentils, chickpeas), dark leafy green vegetables (spinach, beet, broccoli, kale), meet (chicken, ham, turkey, pork, eggs), tofu.

## 2024-08-30 ENCOUNTER — TELEPHONE (OUTPATIENT)
Dept: FAMILY MEDICINE | Facility: CLINIC | Age: 44
End: 2024-08-30
Payer: COMMERCIAL

## 2024-08-30 NOTE — TELEPHONE ENCOUNTER
Left message with mandarin  for patient to return call to discuss lab results.     Lili LUCAS CMA on August 30, 2024 at 2:23 PM

## 2024-09-03 DIAGNOSIS — E55.9 VITAMIN D DEFICIENCY: ICD-10-CM

## 2024-09-03 DIAGNOSIS — D50.9 NORMOCYTIC HYPOCHROMIC ANEMIA: Primary | ICD-10-CM

## 2024-09-03 RX ORDER — FAMOTIDINE 20 MG
25 TABLET ORAL DAILY
Qty: 90 CAPSULE | Refills: 3 | Status: SHIPPED | OUTPATIENT
Start: 2024-09-03

## 2024-09-03 RX ORDER — FERROUS SULFATE 325(65) MG
325 TABLET ORAL
Qty: 30 TABLET | Refills: 0 | Status: SHIPPED | OUTPATIENT
Start: 2024-09-03 | End: 2024-10-02

## 2024-09-03 NOTE — TELEPHONE ENCOUNTER
Spoke with patient using mandarin  and informed her of results.     Lili LUCAS CMA on September 3, 2024 at 4:12 PM

## 2024-10-01 DIAGNOSIS — D50.9 NORMOCYTIC HYPOCHROMIC ANEMIA: ICD-10-CM

## 2024-10-02 RX ORDER — FERROUS SULFATE 325(65) MG
325 TABLET ORAL DAILY
Qty: 30 TABLET | Refills: 0 | Status: SHIPPED | OUTPATIENT
Start: 2024-10-02

## 2024-10-02 NOTE — TELEPHONE ENCOUNTER
Please have patient return for lab only visit to recheck labs after iron supplementation.  If CBC and iron studies within normal range, no need to continue iron supplementation.  If CBC and iron studies continue to be decreased, will continue iron supplementation for 2 additional months. Will send prescription if needed. Labs already pending.

## 2024-10-02 NOTE — TELEPHONE ENCOUNTER
Called patient to relay results from Irene, patient verbalized understanding and was scheduled in [place] for her labs to get those completed.     Amadou Zapien RN  [facility]

## 2024-10-04 ENCOUNTER — LAB (OUTPATIENT)
Dept: LAB | Facility: CLINIC | Age: 44
End: 2024-10-04
Payer: COMMERCIAL

## 2024-10-04 DIAGNOSIS — D50.9 NORMOCYTIC HYPOCHROMIC ANEMIA: ICD-10-CM

## 2024-10-04 LAB
ERYTHROCYTE [DISTWIDTH] IN BLOOD BY AUTOMATED COUNT: 21 % (ref 10–15)
HCT VFR BLD AUTO: 37.6 % (ref 35–47)
HGB BLD-MCNC: 11.8 G/DL (ref 11.7–15.7)
MCH RBC QN AUTO: 26.2 PG (ref 26.5–33)
MCHC RBC AUTO-ENTMCNC: 31.4 G/DL (ref 31.5–36.5)
MCV RBC AUTO: 84 FL (ref 78–100)
PLATELET # BLD AUTO: 242 10E3/UL (ref 150–450)
RBC # BLD AUTO: 4.5 10E6/UL (ref 3.8–5.2)
WBC # BLD AUTO: 4.2 10E3/UL (ref 4–11)

## 2024-10-04 PROCEDURE — 82728 ASSAY OF FERRITIN: CPT

## 2024-10-04 PROCEDURE — 36415 COLL VENOUS BLD VENIPUNCTURE: CPT

## 2024-10-04 PROCEDURE — 85027 COMPLETE CBC AUTOMATED: CPT

## 2024-10-04 PROCEDURE — 83550 IRON BINDING TEST: CPT

## 2024-10-04 PROCEDURE — 83540 ASSAY OF IRON: CPT

## 2024-10-04 NOTE — LETTER
October 5, 2024      Blaine Gonzalez  [address] [place] [address]        Dear ,    We are writing to inform you of your test results.    CBC (complete blood count): hemoglobin and hematocrit (MCH, MCHC, RDW--which are characteristics of red blood cells) have improved from previous with iron supplementation.  Continue iron supplementation for one more month.     Resulted Orders   CBC with platelets   Result Value Ref Range    WBC Count 4.2 4.0 - 11.0 10e3/uL    RBC Count 4.50 3.80 - 5.20 10e6/uL    Hemoglobin 11.8 11.7 - 15.7 g/dL    Hematocrit 37.6 35.0 - 47.0 %    MCV 84 78 - 100 fL    MCH 26.2 (L) 26.5 - 33.0 pg    MCHC 31.4 (L) 31.5 - 36.5 g/dL    RDW 21.0 (H) 10.0 - 15.0 %    Platelet Count 242 150 - 450 10e3/uL       If you have any questions or concerns, please call the clinic at the number listed above.       Sincerely,      PARVEEN Pacheco CNP

## 2024-10-05 LAB
FERRITIN SERPL-MCNC: 34 NG/ML (ref 6–175)
IRON BINDING CAPACITY (ROCHE): ABNORMAL
IRON SATN MFR SERPL: ABNORMAL %
IRON SERPL-MCNC: 338 UG/DL (ref 37–145)

## 2024-10-08 DIAGNOSIS — D50.9 NORMOCYTIC HYPOCHROMIC ANEMIA: Primary | ICD-10-CM

## 2024-10-09 ENCOUNTER — TELEPHONE (OUTPATIENT)
Dept: FAMILY MEDICINE | Facility: CLINIC | Age: 44
End: 2024-10-09
Payer: COMMERCIAL

## 2024-10-09 NOTE — RESULT ENCOUNTER NOTE
Please call patient with Chinese .  CBC (complete blood count): hemoglobin and hematocrit (MCH, MCHC, RDW--which are characteristics of red blood cells) have improved from previous with iron supplementation.   Ferritin (iron stores) level on lower end of normal limits   Iron level very elevated---STOP daily oral iron supplementation.    Need to recheck levels in 2-3 weeks.  Placed orders for lab only visit.

## 2024-10-09 NOTE — TELEPHONE ENCOUNTER
Writer called patient with Mandarin  and relayed results per provider. Patient verbalized understanding and agrees with plan and has no questions at this time. Patient scheduled for lab only appointment on 10/25 at [facility].    Erum Sandoval RN  [facility]       ----- Message from Irene Payne sent at 10/8/2024  8:02 PM CDT -----  Please call patient with Chinese .  CBC (complete blood count): hemoglobin and hematocrit (MCH, MCHC, RDW--which are characteristics of red blood cells) have improved from previous with iron supplementation.   Ferritin (iron stores) level on lower end of normal limits   Iron level very elevated---STOP daily oral iron supplementation.    Need to recheck levels in 2-3 weeks.  Placed orders for lab only visit.

## 2024-10-10 DIAGNOSIS — R79.0 RAISED SERUM IRON: Primary | ICD-10-CM

## 2024-10-25 ENCOUNTER — LAB (OUTPATIENT)
Dept: LAB | Facility: CLINIC | Age: 44
End: 2024-10-25
Payer: COMMERCIAL

## 2024-10-25 DIAGNOSIS — D50.9 NORMOCYTIC HYPOCHROMIC ANEMIA: ICD-10-CM

## 2024-10-25 DIAGNOSIS — R79.0 RAISED SERUM IRON: ICD-10-CM

## 2024-10-25 LAB
ERYTHROCYTE [DISTWIDTH] IN BLOOD BY AUTOMATED COUNT: 19.6 % (ref 10–15)
FERRITIN SERPL-MCNC: 19 NG/ML (ref 6–175)
HCT VFR BLD AUTO: 41.1 % (ref 35–47)
HGB BLD-MCNC: 13.1 G/DL (ref 11.7–15.7)
MCH RBC QN AUTO: 27.2 PG (ref 26.5–33)
MCHC RBC AUTO-ENTMCNC: 31.9 G/DL (ref 31.5–36.5)
MCV RBC AUTO: 85 FL (ref 78–100)
PLATELET # BLD AUTO: 232 10E3/UL (ref 150–450)
RBC # BLD AUTO: 4.82 10E6/UL (ref 3.8–5.2)
WBC # BLD AUTO: 7.3 10E3/UL (ref 4–11)

## 2024-10-25 PROCEDURE — 85027 COMPLETE CBC AUTOMATED: CPT

## 2024-10-25 PROCEDURE — 82728 ASSAY OF FERRITIN: CPT

## 2024-10-25 PROCEDURE — 83550 IRON BINDING TEST: CPT

## 2024-10-25 PROCEDURE — 36415 COLL VENOUS BLD VENIPUNCTURE: CPT

## 2024-10-25 PROCEDURE — 83540 ASSAY OF IRON: CPT

## 2024-10-26 LAB
IRON BINDING CAPACITY (ROCHE): 350 UG/DL (ref 240–430)
IRON SATN MFR SERPL: 24 % (ref 15–46)
IRON SERPL-MCNC: 84 UG/DL (ref 37–145)

## 2025-05-30 ENCOUNTER — TELEPHONE (OUTPATIENT)
Dept: PEDIATRICS | Facility: CLINIC | Age: 45
End: 2025-05-30
Payer: COMMERCIAL

## 2025-05-30 NOTE — LETTER
Eugenia 3, 2025      Blaine Gonzalez  [address] [facility] [address]        Dear MsMarilu,    We are writing to inform you of your test results. I have reviewed your hepatitis B labs. You need to see a liver specialist to determine what other monitoring/treatment you need. Please call [phone] to schedule that appointment.     Additionally, please call the [facility] at [phone] to schedule an in-person follow-up appointment to discuss anemia, low vitamin D and cholesterol.     If you have any questions or concerns, please call the clinic at the number listed above.       Sincerely,      PARVEEN Ling CNP   Electronically signed

## 2025-05-30 NOTE — TELEPHONE ENCOUNTER
Called pt with a Mandarin , not available, so LM to call us back. Will await for her call back.    Nathanael FOSTER  Clinic RN  [facility]

## 2025-05-30 NOTE — TELEPHONE ENCOUNTER
"Please call pt with     I reviewed her hepatitis B labs. She needs to see a liver specialist to determine what other monitoring/treatment she needs. I've referred her. Please schedule. Pls give her the number.    Should also have in person follow-up on:    Anemia  Low vitamin D  Cholesterol    Ideally would see her PCP but possibly moved away from that clinic. Can see myself (ok to use KENDAL), sonido, or Dr. Cabello (resident who she has seen in the past). For visit notes, please put \"anemia, low vitamin D, cholesterol, hep B).  "

## 2025-05-30 NOTE — LETTER
June 5, 2025      Blaine Gonzalez  [address]  [place] [address]        ???Gonzalez???    ???????????????? ??????????????? ????????????????????????????? ??? [phone] ?????     ?????? [phone] ?? [place] Vici ????????????????????????? D ????????     ?????????????????????       ?????      Kirsten Emerson?APRN CNP   ????

## 2025-06-02 NOTE — TELEPHONE ENCOUNTER
"RN attempted to contact patient at [phone] via Mandarin  ID [id number]. No answer, left message requesting call back to any triage nurse. Call back number was provided.    Upon call back relay provider message below:  \"I reviewed her hepatitis B labs. She needs to see a liver specialist to determine what other monitoring/treatment she needs. I've referred her. Please schedule. Pls give her the number.     Should also have in person follow-up on:  Anemia  Low vitamin D  Cholesterol     Ideally would see her PCP but possibly moved away from that clinic. Can see myself (ok to use KENDAL), sonido, or Dr. Cabello (resident who she has seen in the past). For visit notes, please put \"anemia, low vitamin D, cholesterol, hep B).\"    Awaiting call back at this time.    Padmini Mata RN   [facility]    "

## 2025-06-03 ENCOUNTER — PATIENT OUTREACH (OUTPATIENT)
Dept: CARE COORDINATION | Facility: CLINIC | Age: 45
End: 2025-06-03
Payer: COMMERCIAL

## 2025-06-03 NOTE — TELEPHONE ENCOUNTER
Third attempt to reach the patient.     Called the patient at [phone] with the use of  services and left a message requesting a call back   - Provided call back number   - Awaiting a call back at this time      Submitted a letter to translation services for a letter to be sent to the patient's home address on file   - See the 6/3/2025 Results Letter   - Writer will update the letter when the translation is received.     Faviola KEBEDE RN   [facility]

## 2025-06-05 ENCOUNTER — PATIENT OUTREACH (OUTPATIENT)
Dept: CARE COORDINATION | Facility: CLINIC | Age: 45
End: 2025-06-05
Payer: COMMERCIAL

## 2025-06-05 NOTE — TELEPHONE ENCOUNTER
Received the translated letter   - See the 6/5/2025 Results Letter   - Printed the 6/5/2025 Results Letter to the [employer] to be mailed to the patient's home address on file     Faviola KEBEDE RN   [facility]

## 2025-06-24 ENCOUNTER — OFFICE VISIT (OUTPATIENT)
Dept: PEDIATRICS | Facility: CLINIC | Age: 45
End: 2025-06-24
Payer: COMMERCIAL

## 2025-06-24 ENCOUNTER — TELEPHONE (OUTPATIENT)
Dept: PEDIATRICS | Facility: CLINIC | Age: 45
End: 2025-06-24

## 2025-06-24 VITALS
RESPIRATION RATE: 18 BRPM | TEMPERATURE: 98 F | HEART RATE: 67 BPM | SYSTOLIC BLOOD PRESSURE: 116 MMHG | HEIGHT: 64 IN | DIASTOLIC BLOOD PRESSURE: 79 MMHG | WEIGHT: 143.4 LBS | BODY MASS INDEX: 24.48 KG/M2 | OXYGEN SATURATION: 100 %

## 2025-06-24 DIAGNOSIS — D50.9 IRON DEFICIENCY ANEMIA, UNSPECIFIED IRON DEFICIENCY ANEMIA TYPE: Primary | ICD-10-CM

## 2025-06-24 DIAGNOSIS — B18.1 VIRAL HEPATITIS B CHRONIC (H): ICD-10-CM

## 2025-06-24 DIAGNOSIS — E55.9 VITAMIN D DEFICIENCY: ICD-10-CM

## 2025-06-24 PROCEDURE — 3074F SYST BP LT 130 MM HG: CPT | Performed by: PHYSICIAN ASSISTANT

## 2025-06-24 PROCEDURE — 1126F AMNT PAIN NOTED NONE PRSNT: CPT | Performed by: PHYSICIAN ASSISTANT

## 2025-06-24 PROCEDURE — 3078F DIAST BP <80 MM HG: CPT | Performed by: PHYSICIAN ASSISTANT

## 2025-06-24 PROCEDURE — 99213 OFFICE O/P EST LOW 20 MIN: CPT | Performed by: PHYSICIAN ASSISTANT

## 2025-06-24 ASSESSMENT — PAIN SCALES - GENERAL: PAINLEVEL_OUTOF10: NO PAIN (0)

## 2025-06-24 NOTE — PATIENT INSTRUCTIONS
Begin iron 65 mg elemental iron once daily   Vitamin D 2000 international unit(s) daily   Recheck in 3 months.

## 2025-06-24 NOTE — PROGRESS NOTES
"  Assessment & Plan   Iron deficiency anemia, unspecified iron deficiency anemia type  Start iron daily. Repeat labs in three months.  - CBC with platelets and differential; Future  - Iron and iron binding capacity; Future  - Ferritin; Future    Vitamin D deficiency  Start vitamin D 9315-0052 international unit(s) daily and repeat labs in three months.  - Vitamin D Deficiency; Future    Viral hepatitis B chronic (H)  Symptomatic. Patient scheduled with HEPATOLOGY in Sept.       Magnus Valladares is a 45 year old, presenting for the following health issues:  Anemia      6/24/2025     8:00 AM   Additional Questions   Roomed by sage   Accompanied by layne         6/24/2025     8:00 AM   Patient Reported Additional Medications   Patient reports taking the following new medications na     Anemia    History of Present Illness       Reason for visit:  Vit  check up and anemia    She eats 2-3 servings of fruits and vegetables daily.She consumes 0 sweetened beverage(s) daily.She exercises with enough effort to increase her heart rate 30 to 60 minutes per day.  She exercises with enough effort to increase her heart rate 5 days per week.   She is taking medications regularly.      Patient returns to clinic today to follow up on labs.     Labs reveal a hemoglobin of 11.4 with low iron and ferritin levels. Patient does not take supplemental iron.  Labs reveal a vitamin D of 18. Patient does not take supplemental vit d  Hepatitis B labs POSITIVE. Patient has not had follow up with hep for years.      Review of Systems  Constitutional, HEENT, cardiovascular, pulmonary, gi and gu systems are negative, except as otherwise noted.      Objective    /79 (BP Location: Right arm, Patient Position: Sitting, Cuff Size: Adult Regular)   Pulse 67   Temp 98  F (36.7  C) (Tympanic)   Resp 18   Ht 1.626 m (5' 4\")   Wt 65 kg (143 lb 6.4 oz)   LMP 05/29/2025 (Exact Date)   SpO2 100%   BMI 24.61 kg/m    Body mass index is 24.61 " Chief complaint:   Chief Complaint   Patient presents with   • ER F/U       Vitals:  Visit Vitals  /84   Pulse 60   Wt 104.8 kg   SpO2 96%   BMI 39.65 kg/m²       HISTORY OF PRESENT ILLNESS     HPI: Patient presents in the clinic today for ED follow up. She was seen in [facility] at the ED on 07/19/2021 (yesterday).  At that time patient had been experiencing concerns regarding high blood pressure along with dizziness.  Emergency department workup was essentially negative.  She underwent chest x-ray which showed no acute changes.  CT of the head without contrast negative for intracranial hemorrhage or acute intracranial process.  There is mild chronic small-vessel ischemic changes.  EKG with normal sinus rhythm.  There is left axis deviation.  Incomplete right bundle branch block.  This in the emergency department patient was given 5 mg of hydralazine once along with fluid bolus of a 1000 mL.  Lab testing is reviewed of which was negative troponin also negative.  At this time patient reports that she has been doing well as of this morning.  She was discharged home roughly about 9:00 p.m last night.  She has yet to  the meclizine.  She has not monitor blood pressure as of this morning at this time.    She was also found to have a cerumen impaction to the left ear of which was attempted to be irrigated within the emergency department.    Other significant problems:  Patient Active Problem List    Diagnosis Date Noted   • Decreased GFR 10/21/2020     Priority: Low   • Thyroid disease 10/21/2020     Priority: Low     NONTOX MULTINODUL GOITER   Thyroid lobectomy,unilat (Right) 2/2008 Dr. Flower     • Obesity (BMI 30-39.9) 10/12/2015     Priority: Low   • Hyperlipidemia 10/12/2015     Priority: Low   • S/P partial thyroidectomy 10/12/2015     Priority: Low     D/t NONTOX MULTINODUL GOITER.     • Hypertension 01/22/2014     Priority: Low   • Degenerative arthritis of lumbar spine 01/22/2014     Priority: Low    • Recurrent and persistent hematuria 01/01/1999     Priority: Low     2 negative cystoscopies and a negative IVP with persistent moderate levels of hematuria         PAST MEDICAL, FAMILY AND SOCIAL HISTORY     Medications:  Current Outpatient Medications   Medication   • meclizine (ANTIVERT) 25 MG tablet   • metoPROLOL tartrate (LOPRESSOR) 25 MG tablet   • hydrochlorothiazide (HYDRODIURIL) 25 MG tablet   • aspirin 81 MG tablet   • Ascorbic Acid (VITAMIN C) 500 MG CAPS   • Multiple Vitamins-Minerals (ONE-A-DAY 50 PLUS) TABS   • Co-Enzyme Q-10 10 MG CAPS   • Omega-3 Fatty Acids (OMEGA 3 PO)   • calcium carbonate-vitamin D (CALTRATE 600+D) 600-400 MG-UNIT per tablet     No current facility-administered medications for this visit.       Allergies:  ALLERGIES:   Allergen Reactions   • Cardizem DIZZINESS   • Tetracycline Other (See Comments)     So long ago she can't remember       Past Medical  History/Surgeries:  Past Medical History:   Diagnosis Date   • Arthritis    • Essential (primary) hypertension    • Neuromuscular disorder (CMS/HCC)     ? Fibromyalgia.   • RAD (reactive airway disease)    • Stage 2 chronic kidney disease due to benign hypertension    • Thyroid disease        Past Surgical History:   Procedure Laterality Date   • Back surgery      L4-5 surgery 3/7/2013   • Breast surgery  2000    benign tumor removed.    • Colonoscopy  04/30/2013   • Hemorrhoidectomy,external     • Thyroidectomy, partial Right 2008    Thyroid lobectomy,unilat (Right) 2/2008 Dr. Flower   • Tonsillectomy and adenoidectomy     • Tubal ligation         Family History:  Family History   Problem Relation Age of Onset   • Depression Mother    • Diabetes Mother    • Heart disease Mother    • High blood pressure Mother    • Osteoarthritis Mother    • Hyperlipidemia Mother    • Thyroid Mother    • Diabetes Brother    • Heart disease Brother         rheumatic fever and CABG   • High blood pressure Brother    • Hyperlipidemia Brother    •  kg/m .  Physical Exam   GENERAL: alert and no distress    Results for orders placed or performed in visit on 05/29/25   Iron and iron binding capacity     Status: Abnormal   Result Value Ref Range    Iron 20 (L) 37 - 145 ug/dL    Iron Binding Capacity 404 240 - 430 ug/dL    Iron Sat Index 5 (L) 15 - 46 %   Ferritin     Status: Normal   Result Value Ref Range    Ferritin 10 6 - 175 ng/mL   Vitamin D Deficiency     Status: Abnormal   Result Value Ref Range    Vitamin D, Total (25-Hydroxy) 18 (L) 20 - 50 ng/mL    Narrative    Season, race, dietary intake, and treatment affect the concentration of 25-hydroxy-Vitamin D. Values may decrease during winter months and increase during summer months.    Vitamin D determination is routinely performed by an immunoassay specific for 25 hydroxyvitamin D3.  If an individual is on vitamin D2(ergocalciferol) supplementation, please specify 25 OH vitamin D2 and D3 level determination by LCMSMS test [id number].     Hepatitis B Surface Antibody     Status: None   Result Value Ref Range    Hepatitis B Surface Antibody Nonreactive     Hepatitis B Surface Antibody Instrument Value <3.50 <8.5 m[IU]/mL   Hepatitis B surface antigen     Status: Abnormal   Result Value Ref Range    Hepatitis B Surface Antigen Reactive (A) Nonreactive   Hepatitis B core antibody     Status: Abnormal   Result Value Ref Range    Hepatitis B Core Antibody Total Reactive (A) Nonreactive   Hepatic panel (Albumin, ALT, AST, Bili, Alk Phos, TP)     Status: Normal   Result Value Ref Range    Protein Total 7.5 6.4 - 8.3 g/dL    Albumin 4.5 3.5 - 5.2 g/dL    Bilirubin Total 0.2 <=1.2 mg/dL    Alkaline Phosphatase 72 40 - 150 U/L    AST 28 0 - 45 U/L    ALT 25 0 - 50 U/L    Bilirubin Direct 0.09 0.00 - 0.45 mg/dL   Hep B Virus DNA Quant Real Time PCR     Status: Abnormal   Result Value Ref Range    Hepatitis B DNA IU/mL, Instrument 94,600 (H) Not Detected IU/mL    Hepatitis B log 5.0     Narrative    The luke  HBV assay is  Thyroid Father    • Diabetes Daughter    • Obesity Daughter    • Hypertension Son    • Hypertension Son    • Hypertension Half-Brother    • Hypertension Son    • Neurologic Disorder Son         [place] palsy       Social History:  Social History     Tobacco Use   • Smoking status: Never Smoker   • Smokeless tobacco: Never Used   Substance Use Topics   • Alcohol use: No       REVIEW OF SYSTEMS     Review of Systems   Constitutional: Negative for chills and fever.   HENT: Negative for tinnitus.    Eyes: Negative for visual disturbance.   Respiratory: Negative for cough, shortness of breath and wheezing.    Cardiovascular: Negative for chest pain and palpitations.   Endocrine: Negative for polydipsia, polyphagia and polyuria.   Neurological: Negative for dizziness, seizures, syncope, weakness and light-headedness.       PHYSICAL EXAM     Physical Exam  Constitutional:       General: She is not in acute distress.     Appearance: She is well-developed. She is not diaphoretic.   HENT:      Head: Normocephalic and atraumatic.      Right Ear: Tympanic membrane, ear canal and external ear normal. There is no impacted cerumen.      Left Ear: Tympanic membrane, ear canal and external ear normal. There is no impacted cerumen.      Ears:      Comments: Small amount of residual cerumen to the left ear however no obstruction of view to TM.      Mouth/Throat:      Mouth: Mucous membranes are moist.      Pharynx: No oropharyngeal exudate or posterior oropharyngeal erythema.   Eyes:      General:         Right eye: No discharge.         Left eye: No discharge.      Extraocular Movements: Extraocular movements intact.      Conjunctiva/sclera: Conjunctivae normal.      Pupils: Pupils are equal, round, and reactive to light.   Neck:      Thyroid: No thyromegaly.      Vascular: No carotid bruit.   Cardiovascular:      Rate and Rhythm: Normal rate and regular rhythm.      Pulses:           Posterior tibial pulses are 2+ on the right side  an FDA-approved in vitro nucleic acid amplification test for the quantification of hepatitis B virus (HBV) DNA in human serum or plasma, using the Roche luke  instrument system for automated viral nucleic acid extraction (silica-based capture technique), purification, amplification, and detection of the viral nucleic acid target. This assay targets the highly conserved pre-Core/Core region of the HBV genome and generates amplification products that are detected real-time by a sequence specific TaqMan probe during amplification. The test is intended for use as an aid in the management of HBV infected patients undergoing anti-viral therapy. Titer results are reported in International Units/mL (IU/mL).   Hemoglobin A1c     Status: Normal   Result Value Ref Range    Estimated Average Glucose 108 <117 mg/dL    Hemoglobin A1C 5.4 0.0 - 5.6 %   Lipid panel reflex to direct LDL Fasting     Status: Abnormal   Result Value Ref Range    Cholesterol 233 (H) <200 mg/dL    Triglycerides 89 <150 mg/dL    Direct Measure HDL 68 >=50 mg/dL    LDL Cholesterol Calculated 147 (H) <100 mg/dL    Non HDL Cholesterol 165 (H) <130 mg/dL    Patient Fasting > 8hrs? No     Narrative    Cholesterol  Desirable: < 200 mg/dL  Borderline High: 200 - 239 mg/dL  High: >= 240 mg/dL    Triglycerides  Normal: < 150 mg/dL  Borderline High: 150 - 199 mg/dL  High: 200-499 mg/dL  Very High: >= 500 mg/dL    Direct Measure HDL  Female: >= 50 mg/dL   Male: >= 40 mg/dL    LDL Cholesterol  Desirable: < 100 mg/dL  Above Desirable: 100 - 129 mg/dL   Borderline High: 130 - 159 mg/dL   High:  160 - 189 mg/dL   Very High: >= 190 mg/dL    Non HDL Cholesterol  Desirable: < 130 mg/dL  Above Desirable: 130 - 159 mg/dL  Borderline High: 160 - 189 mg/dL  High: 190 - 219 mg/dL  Very High: >= 220 mg/dL   CBC with platelets and differential     Status: Abnormal   Result Value Ref Range    WBC Count 5.1 4.0 - 11.0 10e3/uL    RBC Count 4.29 3.80 - 5.20 10e6/uL    Hemoglobin  11.4 (L) 11.7 - 15.7 g/dL    Hematocrit 36.0 35.0 - 47.0 %    MCV 84 78 - 100 fL    MCH 26.6 26.5 - 33.0 pg    MCHC 31.7 31.5 - 36.5 g/dL    RDW 13.9 10.0 - 15.0 %    Platelet Count 276 150 - 450 10e3/uL    % Neutrophils 59 %    % Lymphocytes 33 %    % Monocytes 6 %    % Eosinophils 2 %    % Basophils 1 %    % Immature Granulocytes 0 %    Absolute Neutrophils 3.0 1.6 - 8.3 10e3/uL    Absolute Lymphocytes 1.7 0.8 - 5.3 10e3/uL    Absolute Monocytes 0.3 0.0 - 1.3 10e3/uL    Absolute Eosinophils 0.1 0.0 - 0.7 10e3/uL    Absolute Basophils 0.0 0.0 - 0.2 10e3/uL    Absolute Immature Granulocytes 0.0 <=0.4 10e3/uL   Vitamin B12     Status: Normal   Result Value Ref Range    Vitamin B12 1,069 232 - 1,245 pg/mL   Hepatitis Be antigen     Status: None   Result Value Ref Range    Hepatitis Be Agn Negative Negative   Hepatitis delta antibody     Status: None   Result Value Ref Range    Hepatitis Delta Antibody Negative Negative   CBC with platelets and differential     Status: Abnormal    Narrative    The following orders were created for panel order CBC with platelets and differential.  Procedure                               Abnormality         Status                     ---------                               -----------         ------                     CBC with platelets and ...[[id number]]  Abnormal            Final result                 Please view results for these tests on the individual orders.           Signed Electronically by: Elton Gaffney PA-C     and 2+ on the left side.      Heart sounds: Normal heart sounds.   Pulmonary:      Effort: Pulmonary effort is normal. No respiratory distress.      Breath sounds: Normal breath sounds. No wheezing.   Musculoskeletal:      Cervical back: Neck supple.      Right lower le+ Edema present.      Left lower le+ Edema present.   Skin:     General: Skin is warm and dry.   Neurological:      Mental Status: She is alert and oriented to person, place, and time.      Cranial Nerves: No cranial nerve deficit.      Coordination: Coordination normal.      Gait: Gait normal.   Psychiatric:         Mood and Affect: Mood normal.         Behavior: Behavior normal.         Thought Content: Thought content normal.         Judgment: Judgment normal.         ASSESSMENT/PLAN     1. Hypertension  (primary encounter diagnosis)  Plan:  Blood pressure is well controlled at this time.  Continue on current antihypertensive regimen.  Will plan to touch base in 2 weeks to verify how her blood pressures have been controlled over this time. Reminder set while in room with patient.     2. Benign paroxysmal vertigo, unspecified laterality  Plan:  Patient has been symptom free since evaluation in the emergency department last night.  Encouraged picking up meclizine to have on hand.  If her symptoms persist to notify me.  At that time we may proceed with re-evaluation or along with PT consultation.    3. Impacted cerumen of left ear  Plan: Resolved.     Follow-up as needed or as scheduled at this time.  Patient is agreeable to plan of care, no further questions at this time.

## 2025-06-24 NOTE — TELEPHONE ENCOUNTER
Called pt with [facility]    Informed pt that she has an appointment scheduled with GI for her Hep B and Liver  Pt verbalized that appointment on 8/19/25 at 12pm worked ok for her   Provided pt with the listed address and floor # for the appointment. Pt verbalized understanding of appointment and location     Abbey Hodge, VF

## 2025-06-24 NOTE — CONFIDENTIAL NOTE
DIAGNOSIS:   Hepatitis B carrier (H)    Appt Date:  08.19.2025     NOTES STATUS DETAILS   OFFICE NOTE from referring provider Internal 05.29.2025 Kirsten Norman APRN CNP    OFFICE NOTES from other specialists Internal 06.24.2025 Elton Gaffney PA-C     08.01.2024 Irene Payne APRN CNP    LABS     HEPATIC PANEL (LIVER PANEL) Internal 05.29.2025    BASIC METABOLIC PANEL Internal 06.14.2023   COMPLETE METABOLIC PANEL Internal 05.29.2025   COMPLETE BLOOD COUNT (CBC) Internal 05.29.2025    HEPATITIS C ANTIBODY Internal 06.14.2023   HEPATITIS B SURFACE ANTIGEN Internal 05.29.2025    HEPATITIS B SURFACE ANTIBODY Internal 05.29.2025   HEPATITIS B CORE ANTIBODY Internal 05.29.2025

## 2025-06-30 PROBLEM — R91.8 PULMONARY NODULES: Status: ACTIVE | Noted: 2024-08-01

## 2025-07-31 DIAGNOSIS — B18.1 CHRONIC VIRAL HEPATITIS B WITHOUT DELTA AGENT AND WITHOUT COMA (H): Primary | ICD-10-CM

## 2025-08-19 ENCOUNTER — LAB (OUTPATIENT)
Dept: LAB | Facility: CLINIC | Age: 45
End: 2025-08-19
Payer: COMMERCIAL

## 2025-08-19 ENCOUNTER — OFFICE VISIT (OUTPATIENT)
Dept: GASTROENTEROLOGY | Facility: CLINIC | Age: 45
End: 2025-08-19
Attending: NURSE PRACTITIONER
Payer: COMMERCIAL

## 2025-08-19 ENCOUNTER — RESULTS FOLLOW-UP (OUTPATIENT)
Dept: PEDIATRICS | Facility: CLINIC | Age: 45
End: 2025-08-19

## 2025-08-19 ENCOUNTER — PRE VISIT (OUTPATIENT)
Dept: GASTROENTEROLOGY | Facility: CLINIC | Age: 45
End: 2025-08-19

## 2025-08-19 VITALS
WEIGHT: 143 LBS | HEIGHT: 64 IN | SYSTOLIC BLOOD PRESSURE: 121 MMHG | DIASTOLIC BLOOD PRESSURE: 83 MMHG | HEART RATE: 69 BPM | OXYGEN SATURATION: 100 % | BODY MASS INDEX: 24.41 KG/M2

## 2025-08-19 DIAGNOSIS — D50.9 IRON DEFICIENCY ANEMIA, UNSPECIFIED IRON DEFICIENCY ANEMIA TYPE: ICD-10-CM

## 2025-08-19 DIAGNOSIS — D64.9 ANEMIA, UNSPECIFIED TYPE: ICD-10-CM

## 2025-08-19 DIAGNOSIS — B18.1 VIRAL HEPATITIS B CHRONIC (H): Primary | ICD-10-CM

## 2025-08-19 DIAGNOSIS — D50.9 IRON DEFICIENCY ANEMIA, UNSPECIFIED IRON DEFICIENCY ANEMIA TYPE: Primary | ICD-10-CM

## 2025-08-19 DIAGNOSIS — B18.1 CHRONIC VIRAL HEPATITIS B WITHOUT DELTA AGENT AND WITHOUT COMA (H): ICD-10-CM

## 2025-08-19 DIAGNOSIS — E55.9 VITAMIN D DEFICIENCY: ICD-10-CM

## 2025-08-19 PROBLEM — E61.1 IRON DEFICIENCY: Status: ACTIVE | Noted: 2025-08-19

## 2025-08-19 LAB
AFP SERPL-MCNC: 3.7 NG/ML
ALBUMIN SERPL BCG-MCNC: 4.4 G/DL (ref 3.5–5.2)
ALP SERPL-CCNC: 61 U/L (ref 40–150)
ALT SERPL W P-5'-P-CCNC: 21 U/L (ref 0–50)
ANION GAP SERPL CALCULATED.3IONS-SCNC: 10 MMOL/L (ref 7–15)
AST SERPL W P-5'-P-CCNC: 22 U/L (ref 0–45)
BASOPHILS # BLD AUTO: 0.04 10E3/UL (ref 0–0.2)
BASOPHILS NFR BLD AUTO: 1.2 %
BILIRUB SERPL-MCNC: 0.3 MG/DL
BILIRUBIN DIRECT (ROCHE PRO & PURE): 0.13 MG/DL (ref 0–0.45)
BUN SERPL-MCNC: 10.4 MG/DL (ref 6–20)
CALCIUM SERPL-MCNC: 9 MG/DL (ref 8.8–10.4)
CHLORIDE SERPL-SCNC: 102 MMOL/L (ref 98–107)
CREAT SERPL-MCNC: 0.69 MG/DL (ref 0.51–0.95)
EGFRCR SERPLBLD CKD-EPI 2021: >90 ML/MIN/1.73M2
EOSINOPHIL # BLD AUTO: 0.11 10E3/UL (ref 0–0.7)
EOSINOPHIL NFR BLD AUTO: 3.3 %
ERYTHROCYTE [DISTWIDTH] IN BLOOD BY AUTOMATED COUNT: 15.2 % (ref 10–15)
FERRITIN SERPL-MCNC: 9 NG/ML (ref 6–175)
FOLATE SERPL-MCNC: 20 NG/ML (ref 4.6–34.8)
GLUCOSE SERPL-MCNC: 96 MG/DL (ref 70–99)
HCO3 SERPL-SCNC: 26 MMOL/L (ref 22–29)
HCT VFR BLD AUTO: 38.6 % (ref 35–47)
HGB BLD-MCNC: 12 G/DL (ref 11.7–15.7)
IMM GRANULOCYTES # BLD: <0.03 10E3/UL
IMM GRANULOCYTES NFR BLD: 0.3 %
INR PPP: 0.98 (ref 0.85–1.15)
IRON BINDING CAPACITY (ROCHE): 400 UG/DL (ref 240–430)
IRON SATN MFR SERPL: 7 % (ref 15–46)
IRON SERPL-MCNC: 29 UG/DL (ref 37–145)
LYMPHOCYTES # BLD AUTO: 1.12 10E3/UL (ref 0.8–5.3)
LYMPHOCYTES NFR BLD AUTO: 33.9 %
MCH RBC QN AUTO: 25.1 PG (ref 26.5–33)
MCHC RBC AUTO-ENTMCNC: 31.1 G/DL (ref 31.5–36.5)
MCV RBC AUTO: 80.6 FL (ref 78–100)
MONOCYTES # BLD AUTO: 0.21 10E3/UL (ref 0–1.3)
MONOCYTES NFR BLD AUTO: 6.4 %
NEUTROPHILS # BLD AUTO: 1.81 10E3/UL (ref 1.6–8.3)
NEUTROPHILS NFR BLD AUTO: 54.9 %
NRBC # BLD AUTO: <0.03 10E3/UL
NRBC BLD AUTO-RTO: 0 /100
PLATELET # BLD AUTO: 265 10E3/UL (ref 150–450)
POTASSIUM SERPL-SCNC: 4.2 MMOL/L (ref 3.4–5.3)
PROT SERPL-MCNC: 7.4 G/DL (ref 6.4–8.3)
PROTHROMBIN TIME: 13.2 SECONDS (ref 11.8–14.8)
RBC # BLD AUTO: 4.79 10E6/UL (ref 3.8–5.2)
SODIUM SERPL-SCNC: 138 MMOL/L (ref 135–145)
VIT D+METAB SERPL-MCNC: 31 NG/ML (ref 20–50)
WBC # BLD AUTO: 3.3 10E3/UL (ref 4–11)

## 2025-08-19 PROCEDURE — 83550 IRON BINDING TEST: CPT | Performed by: PATHOLOGY

## 2025-08-19 PROCEDURE — G0463 HOSPITAL OUTPT CLINIC VISIT: HCPCS | Performed by: PHYSICIAN ASSISTANT

## 2025-08-19 PROCEDURE — 86707 HEPATITIS BE ANTIBODY: CPT | Mod: 90 | Performed by: PATHOLOGY

## 2025-08-19 PROCEDURE — 83540 ASSAY OF IRON: CPT | Performed by: PATHOLOGY

## 2025-08-19 PROCEDURE — 80053 COMPREHEN METABOLIC PANEL: CPT | Performed by: PATHOLOGY

## 2025-08-19 PROCEDURE — 85025 COMPLETE CBC W/AUTO DIFF WBC: CPT | Performed by: PATHOLOGY

## 2025-08-19 PROCEDURE — 36415 COLL VENOUS BLD VENIPUNCTURE: CPT | Performed by: PATHOLOGY

## 2025-08-19 PROCEDURE — 82746 ASSAY OF FOLIC ACID SERUM: CPT | Performed by: NURSE PRACTITIONER

## 2025-08-19 PROCEDURE — 87517 HEPATITIS B DNA QUANT: CPT | Performed by: PHYSICIAN ASSISTANT

## 2025-08-19 PROCEDURE — 99000 SPECIMEN HANDLING OFFICE-LAB: CPT | Performed by: PATHOLOGY

## 2025-08-19 PROCEDURE — 82248 BILIRUBIN DIRECT: CPT | Performed by: PATHOLOGY

## 2025-08-19 PROCEDURE — 82306 VITAMIN D 25 HYDROXY: CPT | Performed by: NURSE PRACTITIONER

## 2025-08-19 PROCEDURE — 85610 PROTHROMBIN TIME: CPT | Performed by: PATHOLOGY

## 2025-08-19 PROCEDURE — 82728 ASSAY OF FERRITIN: CPT | Performed by: PATHOLOGY

## 2025-08-19 PROCEDURE — 82105 ALPHA-FETOPROTEIN SERUM: CPT | Performed by: NURSE PRACTITIONER

## 2025-08-19 RX ORDER — TENOFOVIR DISOPROXIL FUMARATE 300 MG/1
300 TABLET, FILM COATED ORAL DAILY
Qty: 90 TABLET | Refills: 3 | Status: CANCELLED | OUTPATIENT
Start: 2025-08-19

## 2025-08-19 RX ORDER — CHOLECALCIFEROL (VITAMIN D3) 25 MCG
TABLET ORAL
COMMUNITY
Start: 2024-11-29

## 2025-08-19 ASSESSMENT — PAIN SCALES - GENERAL: PAINLEVEL_OUTOF10: NO PAIN (0)

## 2025-08-20 DIAGNOSIS — B18.1 CHRONIC VIRAL HEPATITIS B WITHOUT DELTA AGENT AND WITHOUT COMA (H): Primary | ICD-10-CM

## 2025-08-20 LAB
HBV DNA SERPL NAA+PROBE-ACNC: ABNORMAL IU/ML (ref ?–1)
HBV DNA SERPL NAA+PROBE-LOG IU: 5.3 {LOG_IU}/ML

## 2025-08-20 RX ORDER — FERROUS SULFATE 325(65) MG
325 TABLET ORAL EVERY OTHER DAY
Qty: 45 TABLET | Refills: 0 | Status: SHIPPED | OUTPATIENT
Start: 2025-08-20 | End: 2025-11-18

## 2025-08-20 RX ORDER — TENOFOVIR DISOPROXIL FUMARATE 300 MG/1
300 TABLET, FILM COATED ORAL DAILY
Qty: 90 TABLET | Refills: 3 | Status: SHIPPED | OUTPATIENT
Start: 2025-08-20

## 2025-08-21 LAB — HBV E AB SERPL QL IA: POSITIVE
